# Patient Record
Sex: FEMALE | Race: WHITE | NOT HISPANIC OR LATINO | URBAN - METROPOLITAN AREA
[De-identification: names, ages, dates, MRNs, and addresses within clinical notes are randomized per-mention and may not be internally consistent; named-entity substitution may affect disease eponyms.]

---

## 2017-08-23 ENCOUNTER — TRANSFERRED RECORDS (OUTPATIENT)
Dept: HEALTH INFORMATION MANAGEMENT | Facility: CLINIC | Age: 32
End: 2017-08-23

## 2017-08-31 ENCOUNTER — OFFICE VISIT (OUTPATIENT)
Dept: OPHTHALMOLOGY | Facility: CLINIC | Age: 32
End: 2017-08-31
Attending: OPHTHALMOLOGY
Payer: COMMERCIAL

## 2017-08-31 ENCOUNTER — RESULTS ONLY (OUTPATIENT)
Dept: OTHER | Facility: CLINIC | Age: 32
End: 2017-08-31

## 2017-08-31 DIAGNOSIS — H35.352 CYSTOID MACULAR DEGENERATION OF RETINA, LEFT: ICD-10-CM

## 2017-08-31 DIAGNOSIS — H35.9 RETINAL LESION: ICD-10-CM

## 2017-08-31 DIAGNOSIS — H33.192 BULLOUS RETINOSCHISIS, LEFT: ICD-10-CM

## 2017-08-31 DIAGNOSIS — H30.23 INTERMEDIATE UVEITIS, BILATERAL: Primary | ICD-10-CM

## 2017-08-31 LAB
ALBUMIN SERPL-MCNC: 4 G/DL (ref 3.4–5)
ALP SERPL-CCNC: 108 U/L (ref 40–150)
ALT SERPL W P-5'-P-CCNC: 24 U/L (ref 0–50)
ANION GAP SERPL CALCULATED.3IONS-SCNC: 6 MMOL/L (ref 3–14)
AST SERPL W P-5'-P-CCNC: 17 U/L (ref 0–45)
BASOPHILS # BLD AUTO: 0 10E9/L (ref 0–0.2)
BASOPHILS NFR BLD AUTO: 0.4 %
BILIRUB DIRECT SERPL-MCNC: <0.1 MG/DL (ref 0–0.2)
BILIRUB SERPL-MCNC: 0.3 MG/DL (ref 0.2–1.3)
BUN SERPL-MCNC: 9 MG/DL (ref 7–30)
CALCIUM SERPL-MCNC: 8.6 MG/DL (ref 8.5–10.1)
CHLORIDE SERPL-SCNC: 109 MMOL/L (ref 94–109)
CO2 SERPL-SCNC: 24 MMOL/L (ref 20–32)
CREAT SERPL-MCNC: 0.63 MG/DL (ref 0.52–1.04)
CRP SERPL-MCNC: 4.2 MG/L (ref 0–8)
DIFFERENTIAL METHOD BLD: NORMAL
EOSINOPHIL # BLD AUTO: 0.1 10E9/L (ref 0–0.7)
EOSINOPHIL NFR BLD AUTO: 1.3 %
ERYTHROCYTE [DISTWIDTH] IN BLOOD BY AUTOMATED COUNT: 13.2 % (ref 10–15)
ERYTHROCYTE [SEDIMENTATION RATE] IN BLOOD BY WESTERGREN METHOD: 14 MM/H (ref 0–20)
GFR SERPL CREATININE-BSD FRML MDRD: >90 ML/MIN/1.7M2
GLUCOSE SERPL-MCNC: 116 MG/DL (ref 70–99)
HCT VFR BLD AUTO: 39.7 % (ref 35–47)
HGB BLD-MCNC: 13.4 G/DL (ref 11.7–15.7)
HIV 1+2 AB+HIV1 P24 AG SERPL QL IA: NONREACTIVE
IMM GRANULOCYTES # BLD: 0 10E9/L (ref 0–0.4)
IMM GRANULOCYTES NFR BLD: 0.1 %
LYMPHOCYTES # BLD AUTO: 1.6 10E9/L (ref 0.8–5.3)
LYMPHOCYTES NFR BLD AUTO: 24 %
MCH RBC QN AUTO: 29.4 PG (ref 26.5–33)
MCHC RBC AUTO-ENTMCNC: 33.8 G/DL (ref 31.5–36.5)
MCV RBC AUTO: 87 FL (ref 78–100)
MONOCYTES # BLD AUTO: 0.4 10E9/L (ref 0–1.3)
MONOCYTES NFR BLD AUTO: 5.3 %
NEUTROPHILS # BLD AUTO: 4.7 10E9/L (ref 1.6–8.3)
NEUTROPHILS NFR BLD AUTO: 68.9 %
NRBC # BLD AUTO: 0 10*3/UL
NRBC BLD AUTO-RTO: 0 /100
PLATELET # BLD AUTO: 223 10E9/L (ref 150–450)
POTASSIUM SERPL-SCNC: 3.7 MMOL/L (ref 3.4–5.3)
PROT SERPL-MCNC: 8.1 G/DL (ref 6.8–8.8)
RBC # BLD AUTO: 4.56 10E12/L (ref 3.8–5.2)
SODIUM SERPL-SCNC: 139 MMOL/L (ref 133–144)
WBC # BLD AUTO: 6.8 10E9/L (ref 4–11)

## 2017-08-31 PROCEDURE — 80048 BASIC METABOLIC PNL TOTAL CA: CPT | Performed by: OPHTHALMOLOGY

## 2017-08-31 PROCEDURE — 86140 C-REACTIVE PROTEIN: CPT | Performed by: OPHTHALMOLOGY

## 2017-08-31 PROCEDURE — 82232 ASSAY OF BETA-2 PROTEIN: CPT | Performed by: OPHTHALMOLOGY

## 2017-08-31 PROCEDURE — 80053 COMPREHEN METABOLIC PANEL: CPT | Performed by: OPHTHALMOLOGY

## 2017-08-31 PROCEDURE — 36415 COLL VENOUS BLD VENIPUNCTURE: CPT | Performed by: OPHTHALMOLOGY

## 2017-08-31 PROCEDURE — 83516 IMMUNOASSAY NONANTIBODY: CPT | Performed by: OPHTHALMOLOGY

## 2017-08-31 PROCEDURE — 92240 ICG ANGIOGRAPHY I&R UNI/BI: CPT | Mod: ZF | Performed by: OPHTHALMOLOGY

## 2017-08-31 PROCEDURE — 86480 TB TEST CELL IMMUN MEASURE: CPT | Performed by: OPHTHALMOLOGY

## 2017-08-31 PROCEDURE — 85025 COMPLETE CBC W/AUTO DIFF WBC: CPT | Performed by: OPHTHALMOLOGY

## 2017-08-31 PROCEDURE — 86695 HERPES SIMPLEX TYPE 1 TEST: CPT | Performed by: OPHTHALMOLOGY

## 2017-08-31 PROCEDURE — 86611 BARTONELLA ANTIBODY: CPT | Performed by: OPHTHALMOLOGY

## 2017-08-31 PROCEDURE — 82248 BILIRUBIN DIRECT: CPT | Performed by: OPHTHALMOLOGY

## 2017-08-31 PROCEDURE — 82164 ANGIOTENSIN I ENZYME TEST: CPT | Performed by: OPHTHALMOLOGY

## 2017-08-31 PROCEDURE — 92250 FUNDUS PHOTOGRAPHY W/I&R: CPT | Mod: ZF | Performed by: OPHTHALMOLOGY

## 2017-08-31 PROCEDURE — 81374 HLA I TYPING 1 ANTIGEN LR: CPT | Performed by: OPHTHALMOLOGY

## 2017-08-31 PROCEDURE — 86592 SYPHILIS TEST NON-TREP QUAL: CPT | Performed by: OPHTHALMOLOGY

## 2017-08-31 PROCEDURE — 83876 ASSAY MYELOPEROXIDASE: CPT | Performed by: OPHTHALMOLOGY

## 2017-08-31 PROCEDURE — 92235 FLUORESCEIN ANGRPH MLTIFRAME: CPT | Mod: ZF | Performed by: OPHTHALMOLOGY

## 2017-08-31 PROCEDURE — 86618 LYME DISEASE ANTIBODY: CPT | Performed by: OPHTHALMOLOGY

## 2017-08-31 PROCEDURE — 76510 OPH US DX B-SCAN&QUAN A-SCAN: CPT | Mod: ZF | Performed by: OPHTHALMOLOGY

## 2017-08-31 PROCEDURE — 87389 HIV-1 AG W/HIV-1&-2 AB AG IA: CPT | Performed by: OPHTHALMOLOGY

## 2017-08-31 PROCEDURE — 85652 RBC SED RATE AUTOMATED: CPT | Performed by: OPHTHALMOLOGY

## 2017-08-31 PROCEDURE — 86778 TOXOPLASMA ANTIBODY IGM: CPT | Performed by: OPHTHALMOLOGY

## 2017-08-31 PROCEDURE — 86777 TOXOPLASMA ANTIBODY: CPT | Performed by: OPHTHALMOLOGY

## 2017-08-31 PROCEDURE — 86780 TREPONEMA PALLIDUM: CPT | Performed by: OPHTHALMOLOGY

## 2017-08-31 PROCEDURE — 92134 CPTRZ OPH DX IMG PST SGM RTA: CPT | Mod: ZF | Performed by: OPHTHALMOLOGY

## 2017-08-31 PROCEDURE — 99213 OFFICE O/P EST LOW 20 MIN: CPT | Mod: ZF

## 2017-08-31 PROCEDURE — 86666 EHRLICHIA ANTIBODY: CPT | Performed by: OPHTHALMOLOGY

## 2017-08-31 PROCEDURE — 86696 HERPES SIMPLEX TYPE 2 TEST: CPT | Performed by: OPHTHALMOLOGY

## 2017-08-31 ASSESSMENT — VISUAL ACUITY
METHOD: SNELLEN - LINEAR
CORRECTION_TYPE: GLASSES
OD_CC: 20/20
OS_CC: 20/30

## 2017-08-31 ASSESSMENT — SLIT LAMP EXAM - LIDS
COMMENTS: NORMAL
COMMENTS: NORMAL

## 2017-08-31 ASSESSMENT — CONF VISUAL FIELD
METHOD: COUNTING FINGERS
OS_NORMAL: 1
OD_NORMAL: 1

## 2017-08-31 ASSESSMENT — TONOMETRY
OD_IOP_MMHG: 14
IOP_METHOD: TONOPEN
OS_IOP_MMHG: 14

## 2017-08-31 ASSESSMENT — EXTERNAL EXAM - LEFT EYE: OS_EXAM: NORMAL

## 2017-08-31 ASSESSMENT — EXTERNAL EXAM - RIGHT EYE: OD_EXAM: NORMAL

## 2017-08-31 ASSESSMENT — CUP TO DISC RATIO
OS_RATIO: 0.1
OD_RATIO: 0.1

## 2017-08-31 NOTE — LETTER
"8/31/2017       RE: Suma SIFUENTES  [address] [facility] [address]     Dear Chuy,    Thank you for referring your patient, Suma SIFUENTES, to the EYE CLINIC at [facility]. Please see a copy of my visit note below.    CC -   Consult regarding possible Retinoschisis vs Retinal detachment  left eye     INTERVAL HISTORY - Initial visit    HPI -  Suma SIFUENTES is a  32 year old year-old patient presenting for evaluation of retinoschisis and cystoid macular edema of her left eye. She has a distant history of uveitis, left eye ~2006, when she presented with pain left eye and intraocular inflammation. Per patient diagnosed with uveitis and treated with topical steroids, No records available. per patient extensive lab work up test was negative, including Lyme. She also has a history of IIH. She was initially seen by Dr. Roberts in 2012 and seen at [place] with headache, whooshing sound and blurry vision. MRI images at the time showed is no evidence of an abnormality in the orbits of either eye with slight possibility of a partially empty sella without flattening of the globes or enhancement of the optic nerves      At [place] was told that \"fluid was leaking around her optic nerve\". Did not receive diagnosis of IIH until 2015 in [place] when LP showed elevated ICP and \"some elevated markers for multiple sclerosis (per patient) but not brain lesions on MRI\". She was treated for approximately four months but intolerant to diamox, including joint pain and swelling. She then became pregnant and symptoms resolved after delivery.     Symptoms were quiescent until this July, she experienced eye ache and was seen by optometrist in [place]. Found to have schisis. Previous photos taken in 2016 show progression today (per patient). Seen by Dr. Aparicio 8/23/17 and sent to [facility] for further evaluation.    PAST OCULAR SURGERY  None    RETINAL IMAGING:  OCT 8/31/17  OD - nml  OS - cystoid macular edema, " epiretinal membrane,   schisis confirmed with Optical Coherence Tomography to the mid-inferior periphery without retinal detachment    optos photos 8/31/17  OS - schisis inferonasally and inferotemporally     FAF 8/31/17  OD - nml  OS - inferotemporal and inferonasal hypoautofluorescence     FA 8/31/17  OD 1+ perivenular leakage temporally  OS bullous schisis, tr late optic nerve head leakage , 2-3+ inferotemporal and nasal perivenular leakage    ICG 8/31/17  OD normal  OS normal    Ultrasound: schisis inferiorly with minimal retina  movement on dynamic ultrasound consistent with schisis    ASSESSMENT & PLAN  1) Intermediate uveitis  Right eye: Mild occult vasculitis detected on fluorescein angiography  left eye: retinal vasculitis, vitritis and cystoid macular edema. vitritis and cystoid macular edema likely chronic   Unclear etiology at this point. Long term history of uveitis left eye with prior lab work up negative per patient    Per pt had elevated MS markers on previous LP (will obtain records)   FA with perivascular leakage concerning for vasculitis OS>OD   Plan for complete uveitis panel and MRI w & w/o contrast   Start nevanac for cystoid macular edema left eye    Will start orally steroids once rule out infectious etiology   Will consider IMT for long term management based on results/etiology- will refer to uveitis    Plan for laser retinopexy to inferior periphery today    2) Bullous Retinoschisis, left eye   Concern for progression based on prior notes   Possible serous vs exudative component given history of uveitis and active vaculitis on fluorescein angiography    Will perform laser retinopexy and peripheral laser Today   If continues progression to Retinal detachment despite laser, will consider surgery with vitreous diagnostic biopsy    3) Epiretinal membrane, left eye   Likely related to #2   observe    4) refractive error   -mild astigmatism   Ok to fill previous Rx    return to clinic: 2 weeks  with retina with goldmann visual field (GVF), peripheral and macula Optical Coherence Tomography  Plan for uveitis consult next available      Again, thank you for allowing me to participate in the care of your patient.      Sincerely,    Marta Land MD     Retina Service   Department of Ophthalmology and Visual Neurosciences   [facility]  Phone:  [phone]   Fax:  [phone]

## 2017-08-31 NOTE — NURSING NOTE
Chief Complaints and History of Present Illnesses   Patient presents with     Consult For     Per pt. retinal cysts OS. Her for a second opinion.      HPI    Affected eye(s):  Both   Symptoms:     Blurred vision   No decreased vision   Floaters         Do you have eye pain now?:  No      Comments:  Hx of intercranial hypertension as well as floaters. Went in for exam and states they noted abnormalities that have changed in her left retina since her exam last year. Was sent to a retinal specialist in [place] and he sent her here for a second opinion.  Sophia Interiano COA 9:07 AM August 31, 2017

## 2017-08-31 NOTE — PROGRESS NOTES
"CC -   Consult regarding possible Retinoschisis vs Retinal detachment  left eye     INTERVAL HISTORY - Initial visit    HPI -  Suma SIFUENTES is a  32 year old year-old patient presenting for evaluation of retinoschisis and cystoid macular edema of her left eye. She has a distant history of uveitis, left eye ~2006, when she presented with pain left eye and intraocular inflammation. Per patient diagnosed with uveitis and treated with topical steroids, No records available. per patient extensive lab work up test was negative, including Lyme. She also has a history of IIH. She was initially seen by Dr. Roberts in 2012 and seen at [facility] with headache, whooshing sound and blurry vision. MRI images at the time showed is no evidence of an abnormality in the orbits of either eye with slight possibility of a partially empty sella without flattening of the globes or enhancement of the optic nerves      At [place] was told that \"fluid was leaking around her optic nerve\". Did not receive diagnosis of IIH until 2015 in [place] when LP showed elevated ICP and \"some elevated markers for multiple sclerosis (per patient) but not brain lesions on MRI\". She was treated for approximately four months but intolerant to diamox, including joint pain and swelling. She then became pregnant and symptoms resolved after delivery.     Symptoms were quiescent until this July, she experienced eye ache and was seen by optometrist in [place]. Found to have schisis. Previous photos taken in 2016 show progression today (per patient). Seen by Dr. Aparicio 8/23/17 and sent to [facility] for further evaluation.    PAST OCULAR SURGERY  None    RETINAL IMAGING:  OCT 8/31/17  OD - nml  OS - cystoid macular edema, epiretinal membrane,   schisis confirmed with Optical Coherence Tomography to the mid-inferior periphery without retinal detachment    optos photos 8/31/17  OS - schisis inferonasally and inferotemporally     FAF 8/31/17  OD - nml  OS - inferotemporal and " inferonasal hypoautofluorescence     FA 8/31/17  OD 1+ perivenular leakage temporally  OS bullous schisis, tr late optic nerve head leakage , 2-3+ inferotemporal and nasal perivenular leakage    ICG 8/31/17  OD normal  OS normal    Ultrasound: schisis inferiorly with minimal retina  movement on dynamic ultrasound consistent with schisis    ASSESSMENT & PLAN  1) Intermediate uveitis  Right eye: Mild occult vasculitis detected on fluorescein angiography  left eye: retinal vasculitis, vitritis and cystoid macular edema. vitritis and cystoid macular edema likely chronic   Unclear etiology at this point. Long term history of uveitis left eye with prior lab work up negative per patient    Per pt had elevated MS markers on previous LP (will obtain records)   FA with perivascular leakage concerning for vasculitis OS>OD   Plan for complete uveitis panel and MRI w & w/o contrast   Start nevanac for cystoid macular edema left eye    Will start orally steroids once rule out infectious etiology   Will consider IMT for long term management based on results/etiology- will refer to uveitis    Plan for laser retinopexy to inferior periphery today    2) Bullous Retinoschisis, left eye   Concern for progression based on prior notes   Possible serous vs exudative component given history of uveitis and active vaculitis on fluorescein angiography    Will perform laser retinopexy and peripheral laser Today   If continues progression to Retinal detachment despite laser, will consider surgery with vitreous diagnostic biopsy   Will follow up with fluorescein angiography and Optical Coherence Tomography q3 months approximately     3) Epiretinal membrane, left eye   Likely related to #2   observe    4) refractive error   -mild astigmatism   Ok to fill previous Rx    return to clinic: 2 weeks with retina with goldmann visual field (GVF), peripheral and macula Optical Coherence Tomography  Plan for uveitis consult -- to schedule [place]  appointment next available    Ortiz Das MD  PGY3, Dept of Ophthalmology  Pager [phone]    ~~~~~~~~~~~~~~~~~~~~~~~~~~~~~~~~~~   Complete documentation of historical and exam elements from today's encounter can be found in the full encounter summary report (not reduplicated in this progress note).  I personally obtained the chief complaint(s) and history of present illness.  I confirmed and edited as necessary the review of systems, past medical/surgical history, family history, social history, and examination findings as documented by others; and I examined the patient myself.  I personally reviewed the relevant tests, images, and reports as documented above.  I formulated and edited as necessary the assessment and plan and discussed the findings and management plan with the patient and family    Marta Land MD  .  Retina Service   Department of Ophthalmology and Visual Neurosciences   [facility]  Phone: [phone]   Fax: [phone]

## 2017-08-31 NOTE — MR AVS SNAPSHOT
After Visit Summary   8/31/2017    Suma SIFUENTES    MRN: [id number]           Patient Information     Date Of Birth          1985        Visit Information        Provider Department      8/31/2017 9:15 AM Marta Land MD Eye Clinic        Today's Diagnoses     Intermediate uveitis, bilateral    -  1    Retinal lesion        Bullous retinoschisis, left        Cystoid macular degeneration of retina, left           Follow-ups after your visit        Follow-up notes from your care team     Return in about 2 weeks (around 9/14/2017) for OCT.      Your next 10 appointments already scheduled     Sep 14, 2017  3:15 PM CDT   RETURN RETINA with Marta Land MD   Eye Clinic ([facility])    [address]  [address]  [address]  [facility] [address] [phone]              Future tests that were ordered for you today     Open Future Orders        Priority Expected Expires Ordered    MR Brain and Orbits Routine  8/31/2018 8/31/2017    XR Chest 2 Views Routine 8/31/2017 8/31/2018 8/31/2017            Who to contact     Please call your clinic at [phone] to:    Ask questions about your health    Make or cancel appointments    Discuss your medicines    Learn about your test results    Speak to your doctor   If you have compliments or concerns about an experience at your clinic, or if you wish to file a complaint, please contact [facility] Patient Relations at [phone] or email us at [email or web address]         Additional Information About Your Visit        MyChart Information     [facility] is an electronic gateway that provides easy, online access to your medical records. With [employer], you can request a clinic appointment, read your test results, renew a prescription or communicate with your care team.     To sign up for [facility] visit the website at [email or web address]   You will be asked to enter  the access code listed below, as well as some personal information. Please follow the directions to create your username and password.     Your access code is: [email or web address]  Expires: 2017  6:30 AM     Your access code will  in 90 days. If you need help or a new code, please contact your [facility] or call [phone] for assistance.        Care EveryWhere ID     This is your Care EveryWhere ID. This could be used by other organizations to access your [place] medical records  [id number]         Blood Pressure from Last 3 Encounters:   No data found for BP    Weight from Last 3 Encounters:   No data found for Wt              We Performed the Following     Anaplasma phagocytoph antibody IgG IgM     Angiotensin converting enzyme     Anti Treponema     B Henselae antibody IgG and IgM     Beta 2 microglobulin urine     Bilirubin direct     CBC with platelets differential     Comprehensive metabolic panel     CRP inflammation     Erythrocyte sedimentation rate auto     Fluorescein Angiography OS (left eye)     Fundus Photos OU (both eyes)     HCL FRANCISELLA TULAREMIA DARRION     Herpes Simplex Virus 1 and 2 IgG     HIV Antigen Antibody Combo     HLA-B27 Typing     ICG Angiography OU (both eyes)     Lyme Disease Darrion with reflex to WB Serum     M Tuberculosis by Quantiferon     OCT Retina Spectralis OU (both eyes)     RPR screen with reflex to confirm     Toxoplasma gondii abys IgG and IgM     US B-scan and A-scan OS (left eye)     Vasculitis panel          Today's Medication Changes          These changes are accurate as of: 17  4:47 PM.  If you have any questions, ask your nurse or doctor.               Start taking these medicines.        Dose/Directions    nepafenac 0.1 % ophthalmic susp   Commonly known as:  NEVANAC   Used for:  Cystoid macular degeneration of retina, left   Started by:  Marta Land MD        Dose:  1 drop   Place 1 drop Into the left eye 3  times daily   Quantity:  1 Bottle   Refills:  0            Where to get your medicines      Some of these will need a paper prescription and others can be bought over the counter.  Ask your nurse if you have questions.     Bring a paper prescription for each of these medications     nepafenac 0.1 % ophthalmic susp                Primary Care Provider Office Phone # Fax Jovan Rangel [phone]       [facility] [address]  [facility] [address]        Equal Access to Services     GAEL PAGE : Hadii aad ku hadasho Soomaali, waaxda luqadaha, qaybta kaalmada adeegyada, waxay idiin hayaan adeeg kharaever navarro. [facility] [phone].    ATENCIÓN: Si habla español, tiene a rothman disposición servicios gratuitos de asistencia lingüística. [facility] [phone].    We comply with applicable federal civil rights laws and [place] laws. We do not discriminate on the basis of race, color, national origin, age, disability sex, sexual orientation or gender identity.            Thank you!     Thank you for choosing EYE CLINIC  for your care. Our goal is always to provide you with excellent care. Hearing back from our patients is one way we can continue to improve our services. Please take a few minutes to complete the written survey that you may receive in the mail after your visit with us. Thank you!             Your Updated Medication List - Protect others around you: Learn how to safely use, store and throw away your medicines at www.disposemymeds.org.          This list is accurate as of: 8/31/17  4:47 PM.  Always use your most recent med list.                   Brand Name Dispense Instructions for use Diagnosis    nepafenac 0.1 % ophthalmic susp    NEVANAC    1 Bottle    Place 1 drop Into the left eye 3 times daily    Cystoid macular degeneration of retina, left

## 2017-09-01 LAB
ACE SERPL-CCNC: 24 U/L (ref 9–67)
B BURGDOR IGG+IGM SER QL: 0.07 (ref 0–0.89)
HLA-B27 QL NAA+PROBE: NORMAL
HSV1 IGG SERPL QL IA: >8 AI (ref 0–0.8)
HSV2 IGG SERPL QL IA: <0.2 AI (ref 0–0.8)
MYELOPEROXIDASE AB SER-ACNC: <0.2 AI (ref 0–0.9)
PROTEINASE3 IGG SER-ACNC: <0.2 AI (ref 0–0.9)
RPR SER QL: NEGATIVE
T GONDII IGG SER-ACNC: <3 IU/ML
T GONDII IGM SER-ACNC: 3.1 AU/ML
T PALLIDUM IGG+IGM SER QL: NEGATIVE

## 2017-09-02 LAB
B2 MICROGLOB UR-MCNC: 53 UG/L (ref 0–300)
B2 MICROGLOB/CREAT UR: 64 UG/G CRT (ref 0–300)
CREAT UR-MCNC: 83 MG/DL
PH UR: 6 [PH]

## 2017-09-03 LAB
A PHAGOCYTOPH IGG TITR SER IF: NORMAL {TITER}
A PHAGOCYTOPH IGM TITR SER IF: NORMAL {TITER}
B HENSELAE IGG TITR SER IF: NORMAL {TITER}
B HENSELAE IGM TITR SER IF: NORMAL {TITER}
M TB TUBERC IFN-G BLD QL: NEGATIVE
M TB TUBERC IFN-G/MITOGEN IGNF BLD: 0.02 IU/ML

## 2017-09-05 LAB
B LOCUS: NORMAL
B27TEST METHOD: NORMAL

## 2017-09-06 DIAGNOSIS — H52.13 MYOPIA OF BOTH EYES: Primary | ICD-10-CM

## 2017-09-06 RX ORDER — KETOROLAC TROMETHAMINE 4 MG/ML
1 SOLUTION/ DROPS OPHTHALMIC 3 TIMES DAILY
Qty: 1 BOTTLE | Refills: 11 | Status: SHIPPED | OUTPATIENT
Start: 2017-09-06 | End: 2017-11-17

## 2017-09-14 ENCOUNTER — HOSPITAL ENCOUNTER (OUTPATIENT)
Dept: MRI IMAGING | Facility: CLINIC | Age: 32
Discharge: HOME | End: 2017-09-14
Attending: OPHTHALMOLOGY | Admitting: OPHTHALMOLOGY
Payer: COMMERCIAL

## 2017-09-14 ENCOUNTER — OFFICE VISIT (OUTPATIENT)
Dept: OPHTHALMOLOGY | Facility: CLINIC | Age: 32
End: 2017-09-14
Attending: OPHTHALMOLOGY
Payer: COMMERCIAL

## 2017-09-14 DIAGNOSIS — H30.23 INTERMEDIATE UVEITIS, BILATERAL: ICD-10-CM

## 2017-09-14 DIAGNOSIS — H20.9 UVEITIS: Primary | ICD-10-CM

## 2017-09-14 PROCEDURE — 70553 MRI BRAIN STEM W/O & W/DYE: CPT

## 2017-09-14 PROCEDURE — A9585 GADOBUTROL INJECTION: HCPCS | Performed by: OPHTHALMOLOGY

## 2017-09-14 PROCEDURE — 25000128 H RX IP 250 OP 636: Performed by: OPHTHALMOLOGY

## 2017-09-14 PROCEDURE — 99212 OFFICE O/P EST SF 10 MIN: CPT | Mod: ZF

## 2017-09-14 RX ORDER — GADOBUTROL 604.72 MG/ML
10 INJECTION INTRAVENOUS ONCE
Status: COMPLETED | OUTPATIENT
Start: 2017-09-14 | End: 2017-09-14

## 2017-09-14 RX ADMIN — GADOBUTROL 7.5 ML: 604.72 INJECTION INTRAVENOUS at 14:45

## 2017-09-14 ASSESSMENT — EXTERNAL EXAM - LEFT EYE: OS_EXAM: NORMAL

## 2017-09-14 ASSESSMENT — VISUAL ACUITY
OS_CC: 20/50
CORRECTION_TYPE: GLASSES
OS_CC+: -2
OD_CC: 20/20
METHOD: SNELLEN - LINEAR

## 2017-09-14 ASSESSMENT — CUP TO DISC RATIO
OS_RATIO: 0.1
OD_RATIO: 0.1

## 2017-09-14 ASSESSMENT — SLIT LAMP EXAM - LIDS
COMMENTS: NORMAL
COMMENTS: NORMAL

## 2017-09-14 ASSESSMENT — CONF VISUAL FIELD
OD_NORMAL: 1
OS_NORMAL: 1

## 2017-09-14 ASSESSMENT — TONOMETRY
OD_IOP_MMHG: 13
OS_IOP_MMHG: 12
IOP_METHOD: ICARE

## 2017-09-14 ASSESSMENT — EXTERNAL EXAM - RIGHT EYE: OD_EXAM: NORMAL

## 2017-09-14 NOTE — NURSING NOTE
Chief Complaints and History of Present Illnesses   Patient presents with     Follow Up For     Intermediate uveitis     HPI    Affected eye(s):  Both   Symptoms:        Duration:  2 weeks   Frequency:  Constant       Do you have eye pain now?:  No      Comments:  Pt. States that she is seeing a new blurry spot LE in central vision.  Occasional pain LE.  No flashes or floaters BE.  Lisa Gwen COT 3:35 PM September 14, 2017

## 2017-09-14 NOTE — PROGRESS NOTES
"CC -   Consult regarding possible Retinoschisis vs Retinal detachment  left eye     HPI -  Suma SIFUENTES is a  32 year old year-old patient presenting for evaluation of retinoschisis and cystoid macular edema of her left eye. She has a distant history of uveitis, left eye ~2006, when she presented with pain left eye and intraocular inflammation. Per patient diagnosed with uveitis and treated with topical steroids, No records available. per patient extensive lab work up test was negative, including Lyme. She also has a history of IIH. She was initially seen by Dr. Roberts in 2012 and seen at [place] with headache, whooshing sound and blurry vision. MRI images at the time showed is no evidence of an abnormality in the orbits of either eye with slight possibility of a partially empty sella without flattening of the globes or enhancement of the optic nerves      At [place] was told that \"fluid was leaking around her optic nerve\". Did not receive diagnosis of IIH until 2015 in [place] when LP showed elevated ICP and \"some elevated markers for multiple sclerosis (per patient) but not brain lesions on MRI\". She was treated for approximately four months but intolerant to diamox, including joint pain and swelling. She then became pregnant and symptoms resolved after delivery.     Symptoms were quiescent until this July, she experienced eye ache and was seen by optometrist in [place]. Found to have schisis. Previous photos taken in 2016 show progression today (per patient). Seen by Dr. Aparicio 8/23/17 and sent to [facility] for further evaluation.    Interval history: slightly increased in floaters after the laser. Light sensitivity     PAST OCULAR SURGERY None    RETINAL IMAGING:  OCT 9.14.17  OD - nml  OS - cystoid macular edema, epiretinal membrane, slightly increased cystoid macular edema   schisis confirmed with Optical Coherence Tomography to the mid-inferior periphery without retinal detachment    optos photos 8/31/17  OS - schisis " inferonasally and inferotemporally     FAF 8/31/17  OD - nml  OS - inferotemporal and inferonasal hypoautofluorescence     FA 8/31/17  OD 1+ perivenular leakage temporally  OS bullous schisis, tr late optic nerve head leakage , 2-3+ inferotemporal and nasal perivenular leakage    ICG 8/31/17  OD normal  OS normal    Ultrasound: schisis inferiorly with minimal retina  movement on dynamic ultrasound consistent with schisis    Labs:  Treponema pallidum Antibody neg  Sed Rate 14  CRP Inflammation 4.2  Rapid Plasma Reagin neg  Angiotensin Converting Enzyme 24  M Tuberculosis Antigen Value neg  A Phagocytophil IgG IgM neg  HIV neg  Beta-2-Microglobulin Timed Urine 53 normal   complete blood count (CBC): normal   Comprehensive metabolic panel : normal   Vasculitis panel : normal   B Henselae DARRION IgG: neg  Toxoplasma Antibody IgGand IgM normal   Lyme: neg  Herpes simples virus 1 IgG >8.0 high  Herpes simples virus 2 IgG <0.2  HLAB27 neg    ASSESSMENT & PLAN  1) Intermediate uveitis  Right eye: Mild occult vasculitis detected on fluorescein angiography  left eye: retinal vasculitis, vitritis and cystoid macular edema. vitritis and cystoid macular edema likely chronic   Unclear etiology at this point. Long term history of uveitis left eye with prior lab work up negative per patient    Per pt had elevated MS markers on previous LP (will obtain records)   FA with perivascular leakage concerning for vasculitis OS>OD   Plan for complete uveitis panel and MRI w & w/o contrast  Today Optical Coherence Tomography with increased cystoid macular edema    Start ketorolac four times a day edema left eye    Consider orally steroids? periocular kenalog? Patient to see Dr. Dominguez tomorrow. consider IMT for long term management based on etiology- will refer to uveitis    MRI of the brain and orbits done today- pending results    2) Bullous Retinoschisis, left eye status post laser retinopexy left eye    Good laser demarcation and fluid  not extending posterior to the laser   Initial Concern for progression based on prior notes   Possible serous vs exudative component given history of uveitis and active vaculitis on fluorescein angiography    If continues progression to Retinal detachment despite laser, will consider surgery with vitreous biopsy   consider follow up with fluorescein angiography and Optical Coherence Tomography q3 months approximately or per uveitis recs     3) Epiretinal membrane, left eye   Likely related to #2   observe    4) refractive error   -mild astigmatism   Ok to fill previous Rx    return to clinic: Plan for uveitis consult --Dr. Dominguez  tomorrow    ~~~~~~~~~~~~~~~~~~~~~~~~~~~~~~~~~~   Complete documentation of historical and exam elements from today's encounter can be found in the full encounter summary report (not reduplicated in this progress note).  I personally obtained the chief complaint(s) and history of present illness.  I confirmed and edited as necessary the review of systems, past medical/surgical history, family history, social history, and examination findings as documented by others; and I examined the patient myself.  I personally reviewed the relevant tests, images, and reports as documented above.  I formulated and edited as necessary the assessment and plan and discussed the findings and management plan with the patient and family    Marta Land MD  .  Retina Service   Department of Ophthalmology and Visual Neurosciences   [facility]  Phone: [phone]   Fax: [phone]

## 2017-09-14 NOTE — MR AVS SNAPSHOT
After Visit Summary   2017    Suma SIFUENTES    MRN: [id number]           Patient Information     Date Of Birth          1985        Visit Information        Provider Department      2017 3:15 PM Marta Land MD Eye Clinic        Today's Diagnoses     Uveitis    -  1       Follow-ups after your visit        Follow-up notes from your care team     Return in about 1 day (around 9/15/2017).      Your next 10 appointments already scheduled     Sep 15, 2017  1:00 PM CDT   RETURN UVEITIS with Shannon Dominguez MD   Eye Clinic ([facility])    [address]  [address]  [address] Clin 9a  [facility] [address] [phone]              Who to contact     Please call your clinic at [phone] to:    Ask questions about your health    Make or cancel appointments    Discuss your medicines    Learn about your test results    Speak to your doctor   If you have compliments or concerns about an experience at your clinic, or if you wish to file a complaint, please contact [facility] Patient Relations at [phone] or email us at [email or web address]         Additional Information About Your Visit        MyChart Information     Think Through Learningt is an electronic gateway that provides easy, online access to your medical records. With [employer], you can request a clinic appointment, read your test results, renew a prescription or communicate with your care team.     To sign up for Think Through Learningt visit the website at [email or web address]   You will be asked to enter the access code listed below, as well as some personal information. Please follow the directions to create your username and password.     Your access code is: [email or web address]  Expires: 2017  6:30 AM     Your access code will  in 90 days. If you need help or a new code, please contact your [facility] or call [phone] for  assistance.        Care EveryWhere ID     This is your Care EveryWhere ID. This could be used by other organizations to access your [place] medical records  [id number]         Blood Pressure from Last 3 Encounters:   No data found for BP    Weight from Last 3 Encounters:   No data found for Wt              We Performed the Following     OCT Retina Spectralis OU (both eyes)        Primary Care Provider Office Phone # Fax #    Leilani Rangel [phone]       [facility] [address]  [facility] [address]        Equal Access to Services     [facility]byterian Intercommunity HospitalFERNANDA : Hadii aad ku hadasho Soomaali, waaxda luqadaha, qaybta kaalmada adeegyada, waxay idiin hayaan adeeg kharaever ambrose . So [facility] [phone].    ATENCIÓN: Si habla español, tiene a rothman disposición servicios gratuitos de asistencia lingüística. LlKettering Health Washington Township [phone].    We comply with applicable federal civil rights laws and [place] laws. We do not discriminate on the basis of race, color, national origin, age, disability sex, sexual orientation or gender identity.            Thank you!     Thank you for choosing EYE CLINIC  for your care. Our goal is always to provide you with excellent care. Hearing back from our patients is one way we can continue to improve our services. Please take a few minutes to complete the written survey that you may receive in the mail after your visit with us. Thank you!             Your Updated Medication List - Protect others around you: Learn how to safely use, store and throw away your medicines at www.disposemymeds.org.          This list is accurate as of: 9/14/17  5:04 PM.  Always use your most recent med list.                   Brand Name Dispense Instructions for use Diagnosis    ketorolac tromethamine 0.4 % Soln ophthalmic solution    ACULAR-LS    1 Bottle    Place 1 drop Into the left eye 3 times daily    Intermediate uveitis, bilateral

## 2017-09-15 ENCOUNTER — OFFICE VISIT (OUTPATIENT)
Dept: OPHTHALMOLOGY | Facility: CLINIC | Age: 32
End: 2017-09-15
Attending: OPHTHALMOLOGY
Payer: COMMERCIAL

## 2017-09-15 DIAGNOSIS — H30.23 INTERMEDIATE UVEITIS, BILATERAL: Primary | ICD-10-CM

## 2017-09-15 DIAGNOSIS — H35.352 CYSTOID MACULAR DEGENERATION OF RETINA, LEFT: ICD-10-CM

## 2017-09-15 PROCEDURE — 99213 OFFICE O/P EST LOW 20 MIN: CPT | Mod: ZF

## 2017-09-15 PROCEDURE — 92250 FUNDUS PHOTOGRAPHY W/I&R: CPT | Mod: ZF | Performed by: OPHTHALMOLOGY

## 2017-09-15 RX ORDER — PREDNISONE 20 MG/1
60 TABLET ORAL DAILY
Qty: 180 TABLET | Refills: 0 | Status: SHIPPED | OUTPATIENT
Start: 2017-09-15 | End: 2017-09-29

## 2017-09-15 ASSESSMENT — CUP TO DISC RATIO
OD_RATIO: 0.1
OS_RATIO: 0.1

## 2017-09-15 ASSESSMENT — CONF VISUAL FIELD
OS_NORMAL: 1
OD_NORMAL: 1

## 2017-09-15 ASSESSMENT — VISUAL ACUITY
METHOD: SNELLEN - LINEAR
OD_CC: 20/20
OS_CC: 20/50+2

## 2017-09-15 ASSESSMENT — EXTERNAL EXAM - LEFT EYE: OS_EXAM: NORMAL

## 2017-09-15 ASSESSMENT — TONOMETRY
OD_IOP_MMHG: 13
IOP_METHOD: ICARE
OS_IOP_MMHG: 12

## 2017-09-15 ASSESSMENT — EXTERNAL EXAM - RIGHT EYE: OD_EXAM: NORMAL

## 2017-09-15 ASSESSMENT — SLIT LAMP EXAM - LIDS
COMMENTS: NORMAL
COMMENTS: NORMAL

## 2017-09-15 NOTE — PROGRESS NOTES
CC: Uveitis evaluation     HPI: Suma SIFUENTES is a 32 year old female who presents with    Ocular history:   History of papilledema     Medical history:  Healthy    Review of systems: Intentional weight loss, otherwise negative.     Social history: Suma does not smoke, drinks occ and no IV drug use. She currently has a dog and has never had a cat. She has lived in the [place] her entire life    Laboratory/Imaging Results:    Ocular Imaging:    Impression/Plan:  1.

## 2017-09-15 NOTE — LETTER
September 15, 2017      Marta Land MD  [address] [facility] [facility] [address]  [place] [address]       RE: SHAMIKA SIFUENTES  [address] [place] [address]       Dear Marta,    Thank you for referring your patient, Shamika SIFUENTES, to the uveitis clinic at [facility]. 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 As an infectious etiology was not identified in the lab workup that you ordered, I started her on 60 mg/day prednisone and will see her back in 2 weeks. Please see a copy of my visit note below.    CC: Uveitis evaluation    HPI: Shamika SIFUENTES is a 32 year old  female referred by Dr. Land for uveitis evaluation. In July 2017 she developed left eye ache. Although this symptom was different than her symptoms with IIH, she was concerned that the IIH was returning. She was initially evaluated by optometry and then referred to Dr. Chris Aparicio, who referred her to [facility] for further evaluation of schisis. She saw Dr. Land about 2 weeks ago and was found to have retinal vasculitis.     Ocular history:   1. 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 Retinoschisis cavities left eye. S/p laser barricade left eye 8.31.17 (Shanda).  3. IIH diagnosed in 2015 but sympoms started in 2012, treated with 4 months diamox (had side effects: joint swelling and pain). Symptoms resolved near the end of pregnancy.  4. History of red left eye about 10 years ago, treated with a short course of topical corticosteroids, patient unsure of diagnosis.             Medical history:  1. 2 uncomplicated pregnancies (vaginal delivery), although blood pressure was slightly elevated at the end of more recent pregnancy.  2. Not currently pregnant or breastfeeding.  3. Mirena IUD - not planning pregnancy soon.  4. Negative for diabetes  mellitus.     Review of systems: Unremarkable. Specifically, negative for focal numbness, tingling, episodes of vision loss or diplopia. Negative for genital ulcers and pathergy reaction.    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     Laboratory/Imaging Results:  MRI brain/orbits 17: unremarkable.  Labs 17: Quantiferon, RPR, Treponema pallidum ab, Lyme, HIV, Toxoplasma IgM/IgG, Bartonella henselae IgM/IgG, anti-Anaplsma phagocytophilum (HGA) IgM/IgG, urinary beta-2 microglobulin, anti-MPO, anti-pro3, ACE, ESR, CRP, and HLA-B27 all normal/negative. HSV1 IgG positive, HSV2 IgG negative. Complete blood count (CBC) with diff normal. Complete metabolic panel (CMP) unremarkable except for slightly elevated glucose (116 but non-fasting).    Ocular Imagin.15.17 Optos fundus photos document clinical exam.  17 mac OCT shows intraretinal and subretinal fluid left eye, likely not related to epiretinal membrane.  17 Optos fluorescein angiography shows telangiectatic, leaky vessels in areas of schisis cysts left eye. There is focal venuolar leakage and mild optic nerve head leakage left eye > right eye. Also posterior pole venuolar leakage left eye.    Impression/Plan:  1. 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 Extensive workup was unrevealing.   - Start prednisone 60 mg/day   - Supplement with calcium/vitamin D   - In near future may consider periocular Kenalog as well, if needed   - May require steroid-sparing immunosuppression. Patient wants to receive care closer to home                 ([place]). Recommend that she follow up with Dr. Tapia (rheumatologist, previously saw in                 [phone]) to re-establish care.  2. 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   - Patient to continue to follow with Dr. Land  3. Cystoid macular edema left eye, likely secondary to #1.   - Oral  prednisone course started, as described above    Return to uveitis clinic in 2 weeks V/T/D/mac OCT      Again, thank you for allowing me to participate in the care of your patient.      Sincerely,    Shannon Dominguez MD    Cc: Leilani Rangel MD; Eldon Tapia MD; Chris Aparicio MD

## 2017-09-15 NOTE — MR AVS SNAPSHOT
After Visit Summary   9/15/2017    Suma SIFUENTES    MRN: [id number]           Patient Information     Date Of Birth          1985        Visit Information        Provider Department      9/15/2017 1:00 PM Shannon Dominguez MD Eye Clinic        Today's Diagnoses     Intermediate uveitis, bilateral    -  1      Care Instructions    Prednisone 60 mg/day in the morning  Calcium (1200 mg/day) + vitamin D (800 IU/day)          Follow-ups after your visit        Follow-up notes from your care team     Return in about 2 weeks (around 9/29/2017) for Follow Up.      Your next 10 appointments already scheduled     Sep 29, 2017  3:00 PM CDT   RETURN UVEITIS with Shannon Dominguez MD   Eye Clinic ([facility])    [address] [facility]  [address] [facility]  [address] [id number] [phone]              Who to contact     Please call your clinic at [phone] to:    Ask questions about your health    Make or cancel appointments    Discuss your medicines    Learn about your test results    Speak to your doctor   If you have compliments or concerns about an experience at your clinic, or if you wish to file a complaint, please contact [facility] Patient Relations at [phone] or email us at [email or web address]         Additional Information About Your Visit        MyChart Information     Spartz is an electronic gateway that provides easy, online access to your medical records. With Spartz, you can request a clinic appointment, read your test results, renew a prescription or communicate with your care team.     To sign up for BevSpott visit the website at [email or web address]   You will be asked to enter the access code listed below, as well as some personal information. Please follow the directions to create your username and password.     Your access code is: [email or web address]  Expires: 11/22/2017  6:30 AM     Your access code  will  in 90 days. If you need help or a new code, please contact your [facility] or call [phone] for assistance.        Care EveryWhere ID     This is your Care EveryWhere ID. This could be used by other organizations to access your [place] medical records  [id number]         Blood Pressure from Last 3 Encounters:   No data found for BP    Weight from Last 3 Encounters:   No data found for Wt              We Performed the Following     Fundus Photos OU (both eyes)          Today's Medication Changes          These changes are accurate as of: 9/15/17  2:24 PM.  If you have any questions, ask your nurse or doctor.               Start taking these medicines.        Dose/Directions    predniSONE 20 MG tablet   Commonly known as:  DELTASONE   Used for:  Intermediate uveitis, bilateral   Started by:  Shannon Dominguez MD        Dose:  60 mg   Take 3 tablets (60 mg) by mouth daily   Quantity:  180 tablet   Refills:  0            Where to get your medicines      These medications were sent to [facility]/pharmacy [place][id number] [place] - [address]  [address] [facility] [address] [place] [address]     Phone:  [phone]     predniSONE 20 MG tablet                Primary Care Provider Office Phone # Fax #    Leilani COATS [facility] [phone]       [facility] [address]  [facility] [address]        Equal Access to Services     GAEL PAGE AH: Hadii harley ku hadasho Soomaali, waaxda luqadaha, qaybta kaalmada adeegyada, efra belloin hayaravindn ginny navarro. So [facility] [phone].    ATENCIÓN: Si habla español, tiene a rothman disposición servicios gratuitos de asistencia lingüística. Llame al [phone].    We comply with applicable federal civil rights laws and [place] laws. We do not discriminate on the basis of race, color, national origin, age, disability sex, sexual orientation or gender identity.            Thank you!     Thank you for choosing EYE CLINIC  for  your care. Our goal is always to provide you with excellent care. Hearing back from our patients is one way we can continue to improve our services. Please take a few minutes to complete the written survey that you may receive in the mail after your visit with us. Thank you!             Your Updated Medication List - Protect others around you: Learn how to safely use, store and throw away your medicines at www.disposemymeds.org.          This list is accurate as of: 9/15/17  2:24 PM.  Always use your most recent med list.                   Brand Name Dispense Instructions for use Diagnosis    ketorolac tromethamine 0.4 % Soln ophthalmic solution    ACULAR-LS    1 Bottle    Place 1 drop Into the left eye 3 times daily    Intermediate uveitis, bilateral       predniSONE 20 MG tablet    DELTASONE    180 tablet    Take 3 tablets (60 mg) by mouth daily    Intermediate uveitis, bilateral

## 2017-09-15 NOTE — PROGRESS NOTES
CC: uveitis evaluation    HPI: Suma SIFUENTES is a 32 year old  female referred by Dr. Land for uveitis evaluation. In July 2017 she developed left eye ache. Although this symptom was different than her symptoms with IIH, she was concerned that the IIH was returning. She was initially evaluated by optometry and then referred to Dr. Chris Aparicio, who referred her to [facility] for further evaluation of schisis. She saw Dr. Land about 2 weeks ago and was found to have retinal vasculitis.     Ocular history:   1. 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 Retinoschisis cavities left eye. S/p laser barricade left eye 8.31.17 (Shanda).  3. IIH diagnosed in 2015 but sympoms started in 2012, treated with 4 months diamox (had side effects: joint swelling and pain). Symptoms resolved near the end of pregnancy.  4. History of red left eye about 10 years ago, treated with a short course of topical corticosteroids, patient unsure of diagnosis.     Medical history:  1. 2 uncomplicated pregnancies (vaginal delivery), although blood pressure was slightly elevated at the end of more recent pregnancy.  2. Not currently pregnant or breastfeeding.  3. Mirena IUD - not planning pregnancy soon.  4. Negative for diabetes mellitus.     Review of systems: Unremarkable. Specifically, negative for focal numbness, tingling, episodes of vision loss or diplopia. Negative for genital ulcers and pathergy reaction.    Social history: [employer] . Never smoked. Alcohol use: ~one beverage monthly.     Laboratory/Imaging Results:  MRI brain/orbits 9.14.17: unremarkable.  Labs 8.31.17: Quantiferon, RPR, Treponema pallidum ab, Lyme, HIV, Toxoplasma IgM/IgG, Bartonella henselae IgM/IgG, anti-Anaplsma phagocytophilum (HGA) IgM/IgG, urinary beta-2 microglobulin, anti-MPO, anti-pro3, ACE, ESR, CRP, and HLA-B27 all normal/negative. HSV1 IgG positive, HSV2 IgG negative. Complete blood count (CBC)  with diff normal. Complete metabolic panel (CMP) unremarkable except for slightly elevated glucose (116 but non-fasting).    Ocular Imagin.15.17 Optos fundus photos document clinical exam.  17 mac OCT shows intraretinal and subretinal fluid left eye, likely not related to epiretinal membrane.  17 Optos fluorescein angiography shows telangiectatic, leaky vessels in areas of schisis cysts left eye. There is focal venuolar leakage and mild optic nerve head leakage left eye > right eye. Also posterior pole venuolar leakage left eye.    Impression/Plan:  1. 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 Extensive workup was unrevealing.   - Start prednisone 60 mg/day   - Supplement with calcium/vitamin D   - In near future may consider periocular Kenalog as well, if needed   - May require steroid-sparing immunosuppression. Patient wants to receive care closer to home ([place]). Recommend that she follow up with Dr. Tapia (rheumatologist, previously saw in [phone]) to re-establish care.  2. 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   - Patient to continue to follow with Dr. Land  3. Cystoid macular edema left eye, likely secondary to #1.   - Oral prednisone course started, as described above      Return to uveitis clinic in 2 weeks V/T/D/mac OCT    Attending Physician Attestation: Complete documentation of historical and exam elements from today's encounter can be found in the full encounter summary report (not reduplicated in this progress note). I personally obtained the chief complaint(s) and history of present illness. I confirmed and edited as necessary the review of systems, past medical/surgical history, family history, social history, and examination findings as documented by others. I examined the patient myself. I personally reviewed the relevant tests, images, and reports as documented above, and I agree with the interpretation as documented by the  resident/fellow and edited by me. I formulated and edited as necessary the assessment and plan and discussed the findings and management plan with the patient and family. When a procedure was performed, I either performed the procedure myself or was present for critical portions of the procedure and was immediately available for the entire procedure.   - Shannon Dominguez M.D.

## 2017-09-15 NOTE — NURSING NOTE
Chief Complaints and History of Present Illnesses   Patient presents with     Uveitis Evaluation     HPI    Affected eye(s):  Left   Symptoms:     Blurred vision   Decreased vision   Redness         Do you have eye pain now?:  No      Comments:  Started ketorolac yesterday. No changes since yesterday.     Niki CHIRINOS September 15, 2017 12:16 PM

## 2017-09-29 ENCOUNTER — OFFICE VISIT (OUTPATIENT)
Dept: OPHTHALMOLOGY | Facility: CLINIC | Age: 32
End: 2017-09-29
Attending: OPHTHALMOLOGY
Payer: COMMERCIAL

## 2017-09-29 DIAGNOSIS — H30.23 INTERMEDIATE UVEITIS, BILATERAL: Primary | ICD-10-CM

## 2017-09-29 DIAGNOSIS — H35.352 CYSTOID MACULAR DEGENERATION OF RETINA, LEFT: ICD-10-CM

## 2017-09-29 PROCEDURE — 99212 OFFICE O/P EST SF 10 MIN: CPT

## 2017-09-29 PROCEDURE — 92134 CPTRZ OPH DX IMG PST SGM RTA: CPT | Mod: ZF | Performed by: OPHTHALMOLOGY

## 2017-09-29 RX ORDER — PREDNISONE 20 MG/1
60 TABLET ORAL DAILY
Qty: 180 TABLET | Refills: 0 | Status: SHIPPED | OUTPATIENT
Start: 2017-09-29 | End: 2018-02-23

## 2017-09-29 ASSESSMENT — TONOMETRY
OS_IOP_MMHG: 8
IOP_METHOD: TONOPEN
OD_IOP_MMHG: 12

## 2017-09-29 ASSESSMENT — EXTERNAL EXAM - RIGHT EYE: OD_EXAM: NORMAL

## 2017-09-29 ASSESSMENT — VISUAL ACUITY
CORRECTION_TYPE: GLASSES
OD_CC: 20/20
OS_CC: 20/25
METHOD: SNELLEN - LINEAR
OS_CC+: -2

## 2017-09-29 ASSESSMENT — CONF VISUAL FIELD
OS_NORMAL: 1
OD_NORMAL: 1

## 2017-09-29 ASSESSMENT — CUP TO DISC RATIO
OD_RATIO: 0.1
OS_RATIO: 0.1

## 2017-09-29 ASSESSMENT — SLIT LAMP EXAM - LIDS
COMMENTS: NORMAL
COMMENTS: NORMAL

## 2017-09-29 ASSESSMENT — EXTERNAL EXAM - LEFT EYE: OS_EXAM: NORMAL

## 2017-09-29 NOTE — MR AVS SNAPSHOT
After Visit Summary   9/29/2017    Suma SIFUENTES    MRN: [id number]           Patient Information     Date Of Birth          1985        Visit Information        Provider Department      9/29/2017 3:00 PM Shannon Dominguez MD Eye Clinic        Today's Diagnoses     Intermediate uveitis, bilateral    -  1    Cystoid macular degeneration of retina, left          Care Instructions    Decrease prednisone to 40 mg/day for 2 weeks, then 20 mg/day.          Follow-ups after your visit        Follow-up notes from your care team     Return in about 3 weeks (around 10/20/2017).      Your next 10 appointments already scheduled     Oct 16, 2017 12:15 PM CDT   RETURN RETINA with Marta Land MD   Eye Clinic ([facility])    [address]  [address] [facility]  [address] [address]  [facility] [address] [phone]            Nov 17, 2017 12:30 PM CST   RETURN UVEITIS with Shannon Dominguez MD   Eye Clinic ([facility])    [address]  [address] [address]  [address] [address]  [facility] [address] [phone]              Who to contact     Please call your clinic at [phone] to:    Ask questions about your health    Make or cancel appointments    Discuss your medicines    Learn about your test results    Speak to your doctor   If you have compliments or concerns about an experience at your clinic, or if you wish to file a complaint, please contact [facility] Patient Relations at [phone] or email us at [email or web address]         Additional Information About Your Visit        MyChart Information     [employer] is an electronic gateway that provides easy, online access to your medical records. With [employer], you can request a clinic appointment, read your test results, renew a prescription or communicate with your care team.     To sign up for Aperto Networkst visit the website at [email or web address]   You will be  asked to enter the access code listed below, as well as some personal information. Please follow the directions to create your username and password.     Your access code is: [email or web address]  Expires: 2017  6:30 AM     Your access code will  in 90 days. If you need help or a new code, please contact your [facility] or call [phone] for assistance.        Care EveryWhere ID     This is your Care EveryWhere ID. This could be used by other organizations to access your [place] medical records  [id number]         Blood Pressure from Last 3 Encounters:   No data found for BP    Weight from Last 3 Encounters:   No data found for Wt              We Performed the Following     OCT Retina Spectralis OU (both eyes)          Where to get your medicines      These medications were sent to [facility]/pharmacy #[id number] - [place] - [address]  [address] [facility] [place] [address]     Phone:  [phone]     predniSONE 20 MG tablet          Primary Care Provider Office Phone # Fax #    [facility] [phone]       [facility] [address]  [facility] [address]        Equal Access to Services     GAEL PAGE : Hadii aad ku hadasho Soomaali, waaxda luqadaha, qaybta kaalmada adeegyada, waxay idiin hayaravindn ginny navarro. So [facility] [phone].    ATENCIÓN: Si habla español, tiene a rothman disposición servicios gratuitos de asistencia lingüística. Llame al [phone].    We comply with applicable federal civil rights laws and [place] laws. We do not discriminate on the basis of race, color, national origin, age, disability, sex, sexual orientation, or gender identity.            Thank you!     Thank you for choosing EYE CLINIC  for your care. Our goal is always to provide you with excellent care. Hearing back from our patients is one way we can continue to improve our services. Please take a few minutes to complete the written survey that you may  receive in the mail after your visit with us. Thank you!             Your Updated Medication List - Protect others around you: Learn how to safely use, store and throw away your medicines at www.disposemymeds.org.          This list is accurate as of: 9/29/17  4:09 PM.  Always use your most recent med list.                   Brand Name Dispense Instructions for use Diagnosis    ketorolac tromethamine 0.4 % Soln ophthalmic solution    ACULAR-LS    1 Bottle    Place 1 drop Into the left eye 3 times daily    Intermediate uveitis, bilateral       predniSONE 20 MG tablet    DELTASONE    180 tablet    Take 3 tablets (60 mg) by mouth daily    Intermediate uveitis, bilateral, Cystoid macular degeneration of retina, left

## 2017-09-29 NOTE — LETTER
September 29, 2017    Eldon Tapia MD  [facility]  [address] [place] [address]  Fax: [phone]       RE: SHAMIKA SIFUENTES  [address] [place] [address]       Dear Dr. Tapia,    I had the pleasure of seeing our mutual patient, Shamika SIFUENTES, in the EYE CLINIC at [facility]. I recommend steroid-sparing immunosuppression to treat her ocular inflammatory disease, starting with either methotrexate (goal dose 20-25 mg/wk) or CellCept (goal dose 5455-7159 mg twice a day). She would prefer that you manage her immunosuppressive therapy, so she has scheduled a visit with you. If it would be possible for her to start one of these agents earlier than her currently scheduled appointment in mid-November, that may facilitate a safe but more rapid prednisone taper. Please see a copy of my visit note below.    CC: Uveitis follow up    HPI: Shamika SIFUENTES is a 32 year old  female here for uveitis follow up. Now on oral prednisone 60 mg/day x 13 days, she reports mild improvement in vision left eye. Right eye vision remains at baseline.    Ocular history:   1. 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 In July 2017 she developed left eye ache. Although this symptom was different than her symptoms with IIH, she was concerned that the IIH was returning. She was initially evaluated by optometry and then referred to Dr. Chris Aparicio, who referred her to [facility] for further evaluation of schisis. She saw Dr. Land 8.31.17 and was found to have retinal vasculitis.   2. Retinoschisis cavities left eye. S/p laser barricade left eye 8.31.17 (Shanda).  3. IIH diagnosed in 2015 but symptoms started in 2012, treated with 4 months diamox (had side effects: joint swelling and pain). Symptoms resolved near the end of pregnancy.  4. History of red left eye about 10 years ago, treated with a short course of topical corticosteroids,  patient unsure of diagnosis.     Medical history:  1. 2 uncomplicated pregnancies (vaginal delivery), although blood pressure was slightly elevated at the end of more recent pregnancy.  2. Not currently pregnant or breastfeeding.  3. Mirena IUD - not planning pregnancy soon.  4. Negative for diabetes mellitus.  5. Scalp cyst.     Review of systems: Unremarkable. Specifically, negative for focal numbness, tingling, episodes of vision loss or diplopia. Negative for genital ulcers and pathergy reaction.    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 Alcohol use: ~one beverage monthly.     Laboratory/Imaging Results:  MRI brain/orbits 17: unremarkable.  Labs 17: Quantiferon, RPR, Treponema pallidum ab, Lyme, HIV, Toxoplasma IgM/IgG, Bartonella henselae IgM/IgG, anti-Anaplsma phagocytophilum (HGA) IgM/IgG, urinary beta-2 microglobulin, anti-MPO, anti-pro3, ACE, ESR, CRP, and HLA-B27 all normal/negative. HSV1 IgG positive, HSV2 IgG negative. Complete blood count (CBC) with diff normal. Complete metabolic panel (CMP) unremarkable except for slightly elevated glucose (116 but non-fasting).    Ocular Imagin17 mac OCT:  Right eye: within normal limits, stable vs 17 (i.e. no change with prednisone)  Left eye: epiretinal membrane, small subfoveal hyperreflective focus, intraretinal fluid essentially resolved. CSF decreased from 432 on 17 -> 359 um.    17 Optos fluorescein angiography shows telangiectatic, leaky vessels in areas of schisis cysts left eye. There is focal venuolar leakage and mild optic nerve head leakage left eye > right eye. Also posterior pole venuolar leakage left eye.    Impression/Plan:  1. 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 Macular intraretinal fluid resolved with 60 mg/day prednisone   - Decrease prednisone to 40 mg/day x 2 weeks, then 20 mg/day. May continue tapering steroid if                 mac OCT remains dry  and no evidence of new disease activity (recommend 20 mg/day x 2                 weeks, then 15 mg/day x 2 weeks, then 10 mg/day - repeat fluorescein angiography at 10 mg/day                 or sooner if indicated)   - Supplement with calcium/vitamin D   - May consider periocular Kenalog as well, if needed for inflammatory control, mitchell if patient is                 intolerant of oral prednisone (though she is tolerating it well at present)   - Recommend steroid-sparing immunosuppression. First-line would recommend either                 methotrexate (goal dose 20-25 mg/wk) or CellCept (goal dose 1000 - 1500 mg twice a day). If her                 inflammation does not respond to the first agent, then would recommend Humira. Patient wants to               receive care closer to home ([place]). Recommend that she follow up with Dr. Tapia                 (rheumatologist, previously saw in [id number]) to re-establish care for IMT management. She is                 scheduled to see him in mid-November, but ideally she would start the steroid-sparing agent                 sooner.      2. 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 Does not extend posterior to laser.   - Patient to continue to follow with Dr. Land, plan for follow up in about 3 weeks    3. Cystoid macular edema left eye, likely secondary to #1.   - Improvement with oral prednisone, as described above    Return to clinic:  1. 3 weeks with Dr. Land, if no evidence of disease worsening, may continue oral prednisone taper  2. 6 weeks uveitis clinic: V/T/D/mac OCT/Optos fluorescein angiography transit left eye      Again, thank you for allowing me to participate in the care of your patient.        Sincerely,      Shannon Dominguez MD      Cc:  MD Chris Varner MD

## 2017-09-29 NOTE — Clinical Note
Alexsander Lozano, Her left eye looks better on oral prednisone. Right now she is waiting for her appointment with rheum to start IMT. I sent her rheum a letter asking him if he would start the IMT sooner than her currently scheduled appointment in mid-November. She will be seeing you in follow up in 3 weeks, then me in 6 weeks. Please let me know if you have any questions. Thanks, Shannon

## 2017-09-29 NOTE — NURSING NOTE
Chief Complaints and History of Present Illnesses   Patient presents with     Follow Up For     2 week follow up uveitis     HPI    Affected eye(s):  Both   Symptoms:     Floaters (Comment: LE)   No flashes   No glare   No halos   No photophobia      Frequency:  Constant       Do you have eye pain now?:  No      Comments:  Uveitis BE  No vision change since last visit  Viridiana CHIRINOS 2:34 PM September 29, 2017

## 2017-09-29 NOTE — PROGRESS NOTES
CC: uveitis follow up    HPI: Suma SIFUENTES is a 32 year old  female here for uveitis follow up. Now on oral prednisone 60 mg/day x 13 days, she reports mild improvement in vision left eye. Right eye vision remains at baseline.    Ocular history:   1. 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 In July 2017 she developed left eye ache. Although this symptom was different than her symptoms with IIH, she was concerned that the IIH was returning. She was initially evaluated by optometry and then referred to Dr. Chris Aparicio, who referred her to [facility] for further evaluation of schisis. She saw Dr. Land 8.31.17 and was found to have retinal vasculitis.   2. Retinoschisis cavities left eye. S/p laser barricade left eye 8.31.17 (Shanda).  3. IIH diagnosed in 2015 but sympoms started in 2012, treated with 4 months diamox (had side effects: joint swelling and pain). Symptoms resolved near the end of pregnancy.  4. History of red left eye about 10 years ago, treated with a short course of topical corticosteroids, patient unsure of diagnosis.     Medical history:  1. 2 uncomplicated pregnancies (vaginal delivery), although blood pressure was slightly elevated at the end of more recent pregnancy.  2. Not currently pregnant or breastfeeding.  3. Mirena IUD - not planning pregnancy soon.  4. Negative for diabetes mellitus.  5. Scalp cyst.     Review of systems: Unremarkable. Specifically, negative for focal numbness, tingling, episodes of vision loss or diplopia. Negative for genital ulcers and pathergy reaction.    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     Laboratory/Imaging Results:  MRI brain/orbits 9.14.17: unremarkable.  Labs 8.31.17: Quantiferon, RPR, Treponema pallidum ab, Lyme, HIV, Toxoplasma IgM/IgG, Bartonella henselae IgM/IgG, anti-Anaplsma phagocytophilum (HGA) IgM/IgG, urinary beta-2 microglobulin, anti-MPO, anti-pro3,  ACE, ESR, CRP, and HLA-B27 all normal/negative. HSV1 IgG positive, HSV2 IgG negative. Complete blood count (CBC) with diff normal. Complete metabolic panel (CMP) unremarkable except for slightly elevated glucose (116 but non-fasting).    Ocular Imagin17 mac OCT:  Right eye: within normal limits, stable vs 17 (i.e. no change with prednisone)  Left eye: epiretinal membrane, small subfoveal hyperreflective focus, intraretinal fluid essentially resolved. CSF decreased from 432 on 17 -> 359 um.    17 Optos fluorescein angiography shows telangiectatic, leaky vessels in areas of schisis cysts left eye. There is focal venuolar leakage and mild optic nerve head leakage left eye > right eye. Also posterior pole venuolar leakage left eye.    Impression/Plan:  1. 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 Macular intraretinal fluid resolved with 60 mg/day prednisone   - Decrease prednisone to 40 mg/day x 2 weeks, then 20 mg/day. May continue tapering steroid if mac OCT remains dry and no evidence of new disease activity (recommend 20 mg/day x 2 weeks, then 15 mg/day x 2 weeks, then 10 mg/day - repeat fluorescein angiography at 10 mg/day or sooner if indicated)   - Supplement with calcium/vitamin D   - May consider periocular Kenalog as well, if needed for inflammatory control, mitchell if patient is intolerant of oral prednisone (though she is tolerating it well at present)   - Recommend steroid-sparing immunosuppression. First-line would recommend either methotrexate (goal dose 20-25 mg/wk) or CellCept (goal dose 1000 - 1500 mg twice a day). If her inflammation does not respond to the first agent, then would recommend Humira. Patient wants to receive care closer to home ([place]). Recommend that she follow up with Dr. Tapia (rheumatologist, previously saw in [phone]) to re-establish care for IMT managment. She is scheduled to see him in mid-November, but ideally she  would start the steroid-sparing agent sooner.    2. 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 Does not extend posterior to laser.   - Patient to continue to follow with Dr. Land, plan for follow up in about 3 weeks    3. Cystoid macular edema left eye, likely secondary to #1.   - Improvement with oral prednisone, as described above    Return to clinic:  1. 3 weeks with Dr. Land, if no evidence of disease worsening, may continue oral prednisone taper  2. 6 weeks uveitis clinic: V/T/D/mac OCT/Optos fluorescein angiography transit left eye    Attending Physician Attestation: Complete documentation of historical and exam elements from today's encounter can be found in the full encounter summary report (not reduplicated in this progress note). I personally obtained the chief complaint(s) and history of present illness. I confirmed and edited as necessary the review of systems, past medical/surgical history, family history, social history, and examination findings as documented by others. I examined the patient myself. I personally reviewed the relevant tests, images, and reports as documented above, and I agree with the interpretation as documented by the resident/fellow and edited by me. I formulated and edited as necessary the assessment and plan and discussed the findings and management plan with the patient and family. When a procedure was performed, I either performed the procedure myself or was present for critical portions of the procedure and was immediately available for the entire procedure.   - Shannon Dominguez M.D.

## 2017-10-16 ENCOUNTER — OFFICE VISIT (OUTPATIENT)
Dept: OPHTHALMOLOGY | Facility: CLINIC | Age: 32
End: 2017-10-16
Attending: OPHTHALMOLOGY
Payer: COMMERCIAL

## 2017-10-16 DIAGNOSIS — H20.9 UVEITIS: Primary | ICD-10-CM

## 2017-10-16 PROCEDURE — 99213 OFFICE O/P EST LOW 20 MIN: CPT | Mod: ZF

## 2017-10-16 PROCEDURE — 92134 CPTRZ OPH DX IMG PST SGM RTA: CPT | Mod: ZF | Performed by: OPHTHALMOLOGY

## 2017-10-16 RX ORDER — FOLIC ACID 1 MG/1
1 TABLET ORAL
COMMUNITY
Start: 2017-10-12 | End: 2021-01-22

## 2017-10-16 ASSESSMENT — SLIT LAMP EXAM - LIDS
COMMENTS: NORMAL
COMMENTS: NORMAL

## 2017-10-16 ASSESSMENT — VISUAL ACUITY
OS_CC: 20/30
OD_CC: 20/20
OD_CC: J1+
OS_CC+: +1
CORRECTION_TYPE: GLASSES
METHOD: SNELLEN - LINEAR
OS_CC: J1 SLOW

## 2017-10-16 ASSESSMENT — REFRACTION_WEARINGRX
OS_CYLINDER: +1.50
OS_AXIS: 095
OS_SPHERE: -1.50
OD_CYLINDER: +0.50
SPECS_TYPE: SVL
OD_SPHERE: -0.50
OD_AXIS: 087

## 2017-10-16 ASSESSMENT — CUP TO DISC RATIO
OD_RATIO: 0.1
OS_RATIO: 0.1

## 2017-10-16 ASSESSMENT — EXTERNAL EXAM - LEFT EYE: OS_EXAM: NORMAL

## 2017-10-16 ASSESSMENT — CONF VISUAL FIELD
OS_NORMAL: 1
OD_NORMAL: 1

## 2017-10-16 ASSESSMENT — TONOMETRY
OD_IOP_MMHG: 16
OS_IOP_MMHG: 17
IOP_METHOD: TONOPEN

## 2017-10-16 ASSESSMENT — EXTERNAL EXAM - RIGHT EYE: OD_EXAM: NORMAL

## 2017-10-16 NOTE — MR AVS SNAPSHOT
After Visit Summary   10/16/2017    Suma SIFUENTES    MRN: [id number]           Patient Information     Date Of Birth          1985        Visit Information        Provider Department      10/16/2017 12:15 PM Marta Land MD Eye Clinic        Today's Diagnoses     Uveitis    -  1      Care Instructions    Decrease prednisone  Per Dr. Perez recs (recommend 20 mg/day x 2 weeks, then 15 mg/day x 2 weeks, then 10 mg/day - repeat fluorescein angiography at 10 mg/day or sooner if indicated)            Follow-ups after your visit        Your next 10 appointments already scheduled     2017 12:30 PM CST   RETURN UVEITIS with Shannon Dominguez MD   Eye Clinic ([facility])    [facility]  [address]  [facility] [id number] [phone]              Who to contact     Please call your clinic at [phone] to:    Ask questions about your health    Make or cancel appointments    Discuss your medicines    Learn about your test results    Speak to your doctor   If you have compliments or concerns about an experience at your clinic, or if you wish to file a complaint, please contact [facility] Patient Relations at [phone] or email us at [email or web address]         Additional Information About Your Visit        MyChart Information     [employer] is an electronic gateway that provides easy, online access to your medical records. With [employer], you can request a clinic appointment, read your test results, renew a prescription or communicate with your care team.     To sign up for [employer] visit the website at [email or web address]   You will be asked to enter the access code listed below, as well as some personal information. Please follow the directions to create your username and password.     Your access code is: [email or web address]  Expires: 2017  6:30 AM     Your access code will  in 90  days. If you need help or a new code, please contact your [facility] or call [phone] for assistance.        Care EveryWhere ID     This is your Care EveryWhere ID. This could be used by other organizations to access your [facility] medical records  [id number]         Blood Pressure from Last 3 Encounters:   No data found for BP    Weight from Last 3 Encounters:   No data found for Wt              We Performed the Following     OCT Retina Spectralis OU (both eyes)        Primary Care Provider Office Phone # Fax #    Leilani Rangel [phone]       [facility] [address]  [facility] [address]        Equal Access to Services     [facility] Center[facility]NANDA : Hadii aad ku hadasho Soomaali, waaxda luqadaha, qaybta kaalmada adeegyada, waxay idiin hayaravindn ginny ambrose . So [facility] [phone].    ATENCIÓN: Si habla español, tiene a rothman disposición servicios gratuitos de asistencia lingüística. Llame al [phone].    We comply with applicable federal civil rights laws and [place] laws. We do not discriminate on the basis of race, color, national origin, age, disability, sex, sexual orientation, or gender identity.            Thank you!     Thank you for choosing EYE CLINIC  for your care. Our goal is always to provide you with excellent care. Hearing back from our patients is one way we can continue to improve our services. Please take a few minutes to complete the written survey that you may receive in the mail after your visit with us. Thank you!             Your Updated Medication List - Protect others around you: Learn how to safely use, store and throw away your medicines at www.disposemymeds.org.          This list is accurate as of: 10/16/17  1:48 PM.  Always use your most recent med list.                   Brand Name Dispense Instructions for use Diagnosis    folic acid 1 MG tablet    FOLVITE     Take 1 mg by mouth        ketorolac tromethamine 0.4 % Soln  ophthalmic solution    ACULAR-LS    1 Bottle    Place 1 drop Into the left eye 3 times daily    Intermediate uveitis, bilateral       methotrexate 2.5 MG tablet CHEMO      3 tablets by mouth once a week. Increase by 2 tablets every week until at 10 tablets once a week.        predniSONE 20 MG tablet    DELTASONE    180 tablet    Take 3 tablets (60 mg) by mouth daily    Intermediate uveitis, bilateral, Cystoid macular degeneration of retina, left

## 2017-10-16 NOTE — LETTER
10/16/2017       RE: Suma SIFUENTES  [address] [facility] [address]     Dear Colleague,    Thank you for referring your patient, Suma SIFUENTES, to the EYE CLINIC at [facility]. Please see a copy of my visit note below.    CC: uveitis follow up    HPI: Suma SIFUENTES is a 32 year old  female here for uveitis follow up.   Now on oral prednisone 20 mg/day with slow tapering, she reports mild improvement in vision left eye. Right eye vision remains at baseline.  Started MTX 2.5 x3 pills once a week, with slow increase By Dr. Tapia - Rheumatologist from [place]    Ocular history:   1. 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 In July 2017 she developed left eye ache. Although this symptom was different than her symptoms with IIH, she was concerned that the IIH was returning. She was initially evaluated by optometry and then referred to Dr. Chris Aparicio, who referred her to [facility] for further evaluation of schisis. She saw Dr. Land 8.31.17 and was found to have retinal vasculitis.   2. Retinoschisis cavities left eye. S/p laser barricade left eye 8.31.17 (Shanda).  3. IIH diagnosed in 2015 but sympoms started in 2012, treated with 4 months diamox (had side effects: joint swelling and pain). Symptoms resolved near the end of pregnancy.  4. History of red left eye about 10 years ago, treated with a short course of topical corticosteroids, patient unsure of diagnosis.     Medical history:  1. 2 uncomplicated pregnancies (vaginal delivery), although blood pressure was slightly elevated at the end of more recent pregnancy.  2. Not currently pregnant or breastfeeding.  3. Mirena IUD - not planning pregnancy soon.  4. Negative for diabetes mellitus.  5. Scalp cyst.     Review of systems: Unremarkable. Specifically, negative for focal numbness, tingling, episodes of vision loss or diplopia. Negative for genital ulcers and pathergy  reaction.    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     Laboratory/Imaging Results:  MRI brain/orbits 9.14.17: unremarkable.  Labs 8.31.17: Quantiferon, RPR, Treponema pallidum ab, Lyme, HIV, Toxoplasma IgM/IgG, Bartonella henselae IgM/IgG, anti-Anaplsma phagocytophilum (HGA) IgM/IgG, urinary beta-2 microglobulin, anti-MPO, anti-pro3, ACE, ESR, CRP, and HLA-B27 all normal/negative. HSV1 IgG positive, HSV2 IgG negative. Complete blood count (CBC) with diff normal. Complete metabolic panel (CMP) unremarkable except for slightly elevated glucose (116 but non-fasting).    Ocular Imaging:  10.16.17  mac OCT:  Right eye: within normal limits, stable   Left eye: mild epiretinal membrane, intraretinal fluid essentially resolved.   Inferior Optical Coherence Tomography thorough the schisis confirmed schisis without  progression    8.31.17 Optos fluorescein angiography shows telangiectatic, leaky vessels in areas of schisis cysts left eye. There is focal venuolar leakage and mild optic nerve head leakage left eye > right eye. Also posterior pole venuolar leakage left eye.    Impression/Plan:  1. 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 Macular intraretinal fluid resolved with 60 mg/day prednisone   - Decrease prednisone  Per Dr. Ana barkley (recommend 20 mg/day x 2 weeks, then 15 mg/day x 2 weeks, then 10 mg/day - repeat fluorescein angiography at 10 mg/day or sooner if indicated)   - Supplement with calcium/vitamin D   - May consider periocular Kenalog as well, if needed for inflammatory control, mitchell if patient is intolerant of oral prednisone (though she is tolerating it well at present)   - patient started methotrexate (goal dose 20-25 mg/wk). If her inflammation does not respond to the first agent, then would recommend or CellCept (goal dose 1000 - 1500 mg twice a day) or Humira.   Patient will follow up with Dr. Tapia  (rheumatologist, previously saw in [phone]) Nov 27, 2017    2. 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 Does not extend posterior to laser.   - doing well no extension of the schisis   - continue to monitor     3. Cystoid macular edema left eye, likely secondary to #1.   - Improvement with oral prednisone, as described above    Return to clinic:  1. Patient without  Recurrent inflammation, may continue oral prednisone taper  2. May continue follow up with Dr. Alberto smith uveitis clinic: V/T/D/mac OCT/Optos fluorescein angiography transit left eye.  3. Will follow up with retina as needed     Again, thank you for allowing me to participate in the care of your patient.      Sincerely,    MD Marta Boss MD  .  Retina Service   Department of Ophthalmology and Visual Neurosciences   [facility]  Phone: [phone]   Fax: [phone]

## 2017-10-16 NOTE — PATIENT INSTRUCTIONS
Decrease prednisone  Per Dr. Perez recs (recommend 20 mg/day x 2 weeks, then 15 mg/day x 2 weeks, then 10 mg/day - repeat fluorescein angiography at 10 mg/day or sooner if indicated)

## 2017-10-16 NOTE — PROGRESS NOTES
CC: uveitis follow up    HPI: Suma SIFUENTES is a 32 year old  female here for uveitis follow up.   Now on oral prednisone 20 mg/day with slow tapering, she reports mild improvement in vision left eye. Right eye vision remains at baseline.  Started MTX 2.5 x3 pills once a week, with slow increase By Dr. Tapia - Rheumatologist from [place]    Ocular history:   1. 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 In July 2017 she developed left eye ache. Although this symptom was different than her symptoms with IIH, she was concerned that the IIH was returning. She was initially evaluated by optometry and then referred to Dr. Chris Aparicio, who referred her to [facility] for further evaluation of schisis. She saw Dr. Land 8.31.17 and was found to have retinal vasculitis.   2. Retinoschisis cavities left eye. S/p laser barricade left eye 8.31.17 (Shanda).  3. IIH diagnosed in 2015 but sympoms started in 2012, treated with 4 months diamox (had side effects: joint swelling and pain). Symptoms resolved near the end of pregnancy.  4. History of red left eye about 10 years ago, treated with a short course of topical corticosteroids, patient unsure of diagnosis.     Medical history:  1. 2 uncomplicated pregnancies (vaginal delivery), although blood pressure was slightly elevated at the end of more recent pregnancy.  2. Not currently pregnant or breastfeeding.  3. Mirena IUD - not planning pregnancy soon.  4. Negative for diabetes mellitus.  5. Scalp cyst.     Review of systems: Unremarkable. Specifically, negative for focal numbness, tingling, episodes of vision loss or diplopia. Negative for genital ulcers and pathergy reaction.    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 Alcohol use: ~one beverage monthly.     Laboratory/Imaging Results:  MRI brain/orbits 9.14.17: unremarkable.  Labs 8.31.17: Quantiferon, RPR, Treponema pallidum ab, Lyme, HIV, Toxoplasma IgM/IgG, Bartonella  henselae IgM/IgG, anti-Anaplsma phagocytophilum (HGA) IgM/IgG, urinary beta-2 microglobulin, anti-MPO, anti-pro3, ACE, ESR, CRP, and HLA-B27 all normal/negative. HSV1 IgG positive, HSV2 IgG negative. Complete blood count (CBC) with diff normal. Complete metabolic panel (CMP) unremarkable except for slightly elevated glucose (116 but non-fasting).    Ocular Imaging:  10.16.17  mac OCT:  Right eye: within normal limits, stable   Left eye: mild epiretinal membrane, intraretinal fluid essentially resolved.   Inferior Optical Coherence Tomography thorough the schisis confirmed schisis without  progression    8.31.17 Optos fluorescein angiography shows telangiectatic, leaky vessels in areas of schisis cysts left eye. There is focal venuolar leakage and mild optic nerve head leakage left eye > right eye. Also posterior pole venuolar leakage left eye.    Impression/Plan:  1. 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 Macular intraretinal fluid resolved with 60 mg/day prednisone   - Decrease prednisone  Per Dr. Ana barkley (recommend 20 mg/day x 2 weeks, then 15 mg/day x 2 weeks, then 10 mg/day - repeat fluorescein angiography at 10 mg/day or sooner if indicated)   - Supplement with calcium/vitamin D   - May consider periocular Kenalog as well, if needed for inflammatory control, mitchell if patient is intolerant of oral prednisone (though she is tolerating it well at present)   - patient started methotrexate (goal dose 20-25 mg/wk). If her inflammation does not respond to the first agent, then would recommend or CellCept (goal dose 1000 - 1500 mg twice a day) or Humira.   Patient will follow up with Dr. Tapia (rheumatologist, previously saw in [id number]) Nov 27, 2017    2. 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 Does not extend posterior to laser.   - doing well no extension of the schisis   - continue to monitor     3. Cystoid macular edema left eye, likely secondary to  #1.   - Improvement with oral prednisone, as described above    Return to clinic:  1. Patient without  Recurrent inflammation, may continue oral prednisone taper  2. May continue follow up with Dr. Alberto smith uveitis clinic: V/T/D/mac OCT/Optos fluorescein angiography transit left eye.  3. Will follow up with retina as needed     ~~~~~~~~~~~~~~~~~~~~~~~~~~~~~~~~~~   Complete documentation of historical and exam elements from today's encounter can be found in the full encounter summary report (not reduplicated in this progress note).  I personally obtained the chief complaint(s) and history of present illness.  I confirmed and edited as necessary the review of systems, past medical/surgical history, family history, social history, and examination findings as documented by others; and I examined the patient myself.  I personally reviewed the relevant tests, images, and reports as documented above.  I formulated and edited as necessary the assessment and plan and discussed the findings and management plan with the patient and family    Marta Land MD  .  Retina Service   Department of Ophthalmology and Visual Neurosciences   [facility]  Phone: [phone]   Fax: [phone]

## 2017-11-17 ENCOUNTER — OFFICE VISIT (OUTPATIENT)
Dept: OPHTHALMOLOGY | Facility: CLINIC | Age: 32
End: 2017-11-17
Attending: OPHTHALMOLOGY
Payer: COMMERCIAL

## 2017-11-17 DIAGNOSIS — H35.352 CYSTOID MACULAR DEGENERATION OF RETINA, LEFT: ICD-10-CM

## 2017-11-17 DIAGNOSIS — H30.23 INTERMEDIATE UVEITIS, BILATERAL: Primary | ICD-10-CM

## 2017-11-17 PROCEDURE — 92235 FLUORESCEIN ANGRPH MLTIFRAME: CPT | Mod: ZF | Performed by: OPHTHALMOLOGY

## 2017-11-17 PROCEDURE — 99213 OFFICE O/P EST LOW 20 MIN: CPT | Mod: ZF

## 2017-11-17 PROCEDURE — 92134 CPTRZ OPH DX IMG PST SGM RTA: CPT | Mod: ZF | Performed by: OPHTHALMOLOGY

## 2017-11-17 RX ORDER — PREDNISONE 5 MG/1
10 TABLET ORAL DAILY
Qty: 60 TABLET | Refills: 1 | Status: SHIPPED | OUTPATIENT
Start: 2017-11-17 | End: 2018-01-16

## 2017-11-17 ASSESSMENT — SLIT LAMP EXAM - LIDS
COMMENTS: NORMAL
COMMENTS: NORMAL

## 2017-11-17 ASSESSMENT — EXTERNAL EXAM - LEFT EYE: OS_EXAM: NORMAL

## 2017-11-17 ASSESSMENT — CONF VISUAL FIELD
OD_NORMAL: 1
OS_NORMAL: 1
METHOD: COUNTING FINGERS

## 2017-11-17 ASSESSMENT — TONOMETRY
IOP_METHOD: TONOPEN
OD_IOP_MMHG: 16
OS_IOP_MMHG: 15

## 2017-11-17 ASSESSMENT — CUP TO DISC RATIO
OD_RATIO: 0.1
OS_RATIO: 0.1

## 2017-11-17 ASSESSMENT — VISUAL ACUITY
OS_SC: 20/50
OD_SC: 20/40
METHOD: SNELLEN - LINEAR
OD_PH_SC: 20/20
OS_PH_SC: 20/40

## 2017-11-17 ASSESSMENT — EXTERNAL EXAM - RIGHT EYE: OD_EXAM: NORMAL

## 2017-11-17 NOTE — LETTER
November 17, 2017      Marta Ladn MD  [address] [place] [address]      RE: Suma Sifuentes  [address] [facility] [address]       Dear Colleagues:    Thank you for referring your patient, Suma Sifuentes, to the EYE CLINIC at [facility]. Please see a copy of my visit note below.    CC: Uveitis follow up    HPI: Suma SIFUENTES is a 32 year old  female here for uveitis follow up. Now on oral prednisone 10 mg/day with slow tapering. She reports no changes with eyes since last eye clinic visit. Her eye pain has resolved and vision left eye have improved since starting prednisone.    Ocular medications: prednisone 10 mg/day (tapering, started at 60 mg/day 9.16.17), oral methotrexate 25 mg/wk (started 10.16.17, increased to 25 mg/wk 11.13.17) + folic acid. IMT managed by Dr. Tapia ([facility]).    Ocular history:   1. 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 In July 2017 she developed left eye ache. Although this symptom was different than her symptoms with IIH, she was concerned that the IIH was returning. She was initially evaluated by optometry and then referred to Dr. Chris Aparicio, who referred her to [facility] for further evaluation of schisis. She saw Dr. Land 8.31.17 and was found to have retinal vasculitis.   2. Retinoschisis cavities left eye. S/p laser barricade left eye 8.31.17 (Shanda).  3. IIH diagnosed in 2015 but symptoms started in 2012, treated with 4 months diamox (had side effects: joint swelling and pain). Symptoms resolved near the end of pregnancy.  4. History of red left eye about 10 years ago, treated with a short course of topical corticosteroids, patient unsure of diagnosis.     Medical history:  1. 2 uncomplicated pregnancies (vaginal delivery), although blood pressure was slightly elevated at the end of more recent pregnancy.  2. Not currently pregnant or breastfeeding.  3. Mirena IUD -  not planning pregnancy soon.  4. Negative for diabetes mellitus.  5. Scalp cyst.     Review of systems: Unremarkable. Specifically, negative for focal numbness, tingling, episodes of vision loss or diplopia. Negative for genital ulcers and pathergy reaction.    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     Laboratory/Imaging Results:  MRI brain/orbits 17: unremarkable.  Labs 17: Quantiferon, RPR, Treponema pallidum ab, Lyme, HIV, Toxoplasma IgM/IgG, Bartonella henselae IgM/IgG, anti-Anaplsma phagocytophilum (HGA) IgM/IgG, urinary beta-2 microglobulin, anti-MPO, anti-pro3, ACE, ESR, CRP, and HLA-B27 all normal/negative. HSV1 IgG positive, HSV2 IgG negative. Complete blood count (CBC) with diff normal. Complete metabolic panel (CMP) unremarkable except for slightly elevated glucose (116 but non-fasting).    Ocular Imagin17 mac OCT:  Right eye: within normal limits, stable vs 10.16.17  Left eye: mild epiretinal membrane, intraretinal fluid essentially resolved, central retinal thickening questionably worse (+6) vs 10.16.17    Optos fluorescein angiography 17 shows marked improvement of leakage left eye compared to 17.    Impression/Plan:  1. 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 Retinal vasculitis and cystoid macular edema improved today on 10 mg/day prednisone and 25 mg/wk methotrexate (started MTX 1 month ago).   - Continue methotrexate 25 mg/wk with Dr. Tapia; patient is tolerating well. If her inflammation                 does not respond to MTX, then would recommend CellCept (goal dose 1000 - 1500 mg twice a                 day) or Humira.   - Prednisone 10 mg/day for 4 more weeks, then 7.5 mg/day for 2 weeks, then 5 mg/day for 2                 weeks, then 2.5 mg/day for 2 weeks, then stop.   - Supplement with calcium/vitamin D   - May consider periocular Kenalog as well, if needed for  inflammatory control, mitchell if patient is                 intolerant of oral prednisone (though she is tolerating it well at present)    2. 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 Does not extend posterior to laser.   - Continue to monitor     3. Cystoid macular edema left eye, likely secondary to #1.   - Improvement with oral prednisone, as described above        Return to uveitis clinic in 6 weeks: V/T/D/mac OCT  Plan fluorescein angiography at following visit      Again, thank you for allowing me to participate in the care of your patient.        Sincerely,    Shannon Dominguez MD    Cc: MD Eldon Varner MD Andrew Jordan, MD

## 2017-11-17 NOTE — NURSING NOTE
Chief Complaints and History of Present Illnesses   Patient presents with     Follow Up For     Uveitis      HPI    Affected eye(s):  Both   Symptoms:        Frequency:  Constant       Do you have eye pain now?:  No      Comments:  States va is the same since last visit  No red watery or dry  No changes in the floaters  Majo CHAIREZ 12:32 PM November 17, 2017

## 2017-11-17 NOTE — PATIENT INSTRUCTIONS
Continue methotrexate 25 mg/wk  Decrease prednisone: 10 mg/day for 4 more weeks, then 7.5 mg/day for 2 weeks, then 5 mg/day for 2 weeks, then 2.5 mg/day for 2 weeks then stop.

## 2017-11-17 NOTE — LETTER
11/17/2017      RE: Suma Sifuentes  [address] [facility] [address]       CC: uveitis follow up    HPI: Suma SIFUENTES is a 32 year old  female here for uveitis follow up. Now on oral prednisone 10 mg/day with slow tapering. She reports no changes with eyes since last eye clinic visit. Her eye pain has resolved and vision left eye have improved since starting prednisone.    Ocular medications: prednisone 10 mg/day (tapering, started at 60 mg/day 9.16.17), oral methotrexate 25 mg/wk (started 10.16.17, increased to 25 mg/wk 11.13.17) + folic acid. IMT managed by Dr. Tapia ([facility] [place]).    Ocular history:   1. 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 In July 2017 she developed left eye ache. Although this symptom was different than her symptoms with IIH, she was concerned that the IIH was returning. She was initially evaluated by optometry and then referred to Dr. hCris Aparicio, who referred her to [facility] for further evaluation of schisis. She saw Dr. Land 8.31.17 and was found to have retinal vasculitis.   2. Retinoschisis cavities left eye. S/p laser barricade left eye 8.31.17 (Shanda).  3. IIH diagnosed in 2015 but sympoms started in 2012, treated with 4 months diamox (had side effects: joint swelling and pain). Symptoms resolved near the end of pregnancy.  4. History of red left eye about 10 years ago, treated with a short course of topical corticosteroids, patient unsure of diagnosis.     Medical history:  1. 2 uncomplicated pregnancies (vaginal delivery), although blood pressure was slightly elevated at the end of more recent pregnancy.  2. Not currently pregnant or breastfeeding.  3. Mirena IUD - not planning pregnancy soon.  4. Negative for diabetes mellitus.  5. Scalp cyst.     Review of systems: Unremarkable. Specifically, negative for focal numbness, tingling, episodes of vision loss or diplopia. Negative for genital ulcers and pathergy reaction.    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     Laboratory/Imaging Results:  MRI brain/orbits 17: unremarkable.  Labs 17: Quantiferon, RPR, Treponema pallidum ab, Lyme, HIV, Toxoplasma IgM/IgG, Bartonella henselae IgM/IgG, anti-Anaplsma phagocytophilum (HGA) IgM/IgG, urinary beta-2 microglobulin, anti-MPO, anti-pro3, ACE, ESR, CRP, and HLA-B27 all normal/negative. HSV1 IgG positive, HSV2 IgG negative. Complete blood count (CBC) with diff normal. Complete metabolic panel (CMP) unremarkable except for slightly elevated glucose (116 but non-fasting).    Ocular Imagin17 mac OCT:  Right eye: within normal limits, stable vs 10.16.17  Left eye: mild epiretinal membrane, intraretinal fluid essentially resolved, central retinal thickening questionably worse (+6) vs 10.16.17    Optos fluorescein angiography 17 shows marked improvement of leakage left eye compared to 17.    Impression/Plan:  1. 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 Retinal vasculitis and cystoid macular edema improved today on 10 mg/day prednisone and 25 mg/wk methotrexate (started MTX 1 month ago).   - Continue methotrexate 25 mg/wk with Dr. Tapia; patient is tolerating well. If her inflammation does not respond to MTX, then would recommend CellCept (goal dose 1000 - 1500 mg twice a day) or Humira.   - Prednisone 10 mg/day for 4 more weeks, then 7.5 mg/day for 2 weeks, then 5 mg/day for 2 weeks, then 2.5 mg/day for 2 weeks, then stop.   - Supplement with calcium/vitamin D   - May consider periocular Kenalog as well, if needed for inflammatory control, mitchell if patient is intolerant of oral prednisone (though she is tolerating it well at present)    2. 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 Does not extend posterior to laser.   - Continue to monitor     3. Cystoid macular edema left eye, likely secondary to #1.   - Improvement with  oral prednisone, as described above    Return to uveitis clinic in 6 weeks: V/T/D/mac OCT  Plan fluorescein angiography at following visit    Attending Physician Attestation:  Complete documentation of historical and exam elements from today's encounter can be found in the full encounter summary report (not reduplicated in this progress note).  I personally obtained the chief complaint(s) and history of present illness.  I confirmed and edited as necessary the review of systems, past medical/surgical history, family history, social history, and examination findings as documented by others; and I examined the patient myself.  I personally reviewed the relevant tests, images, and reports as documented above.  I formulated and edited as necessary the assessment and plan and discussed the findings and management plan with the patient and family.  - Shannon Dominguez M.D.          Shannon Dominguez MD

## 2017-11-17 NOTE — MR AVS SNAPSHOT
After Visit Summary   11/17/2017    Suma Schulz    MRN: [id number]           Patient Information     Date Of Birth          1985        Visit Information        Provider Department      11/17/2017 12:30 PM Shannon Dominguez MD Eye Clinic        Today's Diagnoses     Intermediate uveitis, bilateral    -  1    Cystoid macular degeneration of retina, left          Care Instructions    Continue methotrexate 25 mg/wk  Decrease prednisone: 10 mg/day for 4 more weeks, then 7.5 mg/day for 2 weeks, then 5 mg/day for 2 weeks, then 2.5 mg/day for 2 weeks then stop.          Follow-ups after your visit        Follow-up notes from your care team     Return for Follow Up in 6 weeks and 12 weeks.      Your next 10 appointments already scheduled     Jan 05, 2018  2:00 PM CST   RETURN UVEITIS with Shannon Dominguez MD   Eye Clinic ([facility])    [address]  [address]  [facility] [address]   [phone]            Feb 16, 2018 12:00 PM CST   RETURN UVEITIS with Shannon Dominguez MD   Eye Clinic ([facility])    [address]  [address]  [facility] [address]   [phone]              Who to contact     Please call your clinic at [phone] to:    Ask questions about your health    Make or cancel appointments    Discuss your medicines    Learn about your test results    Speak to your doctor   If you have compliments or concerns about an experience at your clinic, or if you wish to file a complaint, please contact [facility] Patient Relations at [phone] or email us at [email or web address]         Additional Information About Your Visit        MyChart Information     Nourish is an electronic gateway that provides easy, online access to your medical records. With Nourish, you can request a clinic appointment, read your test results, renew a prescription or  communicate with your care team.     To sign up for [employer] visit the website at [email or web address]   You will be asked to enter the access code listed below, as well as some personal information. Please follow the directions to create your username and password.     Your access code is: [email or web address]  Expires: 2017  5:30 AM     Your access code will  in 90 days. If you need help or a new code, please contact your [facility] or call [phone] for assistance.        Care EveryWhere ID     This is your Care EveryWhere ID. This could be used by other organizations to access your [place] medical records  [id number]         Blood Pressure from Last 3 Encounters:   No data found for BP    Weight from Last 3 Encounters:   No data found for Wt              We Performed the Following     Fluorescein Angiography OU (both eyes)     OCT Retina Spectralis OU (both eyes)          Today's Medication Changes          These changes are accurate as of: 17  2:10 PM.  If you have any questions, ask your nurse or doctor.               These medicines have changed or have updated prescriptions.        Dose/Directions    * predniSONE 20 MG tablet   Commonly known as:  DELTASONE   This may have changed:  Another medication with the same name was added. Make sure you understand how and when to take each.   Used for:  Intermediate uveitis, bilateral, Cystoid macular degeneration of retina, left   Changed by:  Shannon Dominguez MD        Dose:  60 mg   Take 3 tablets (60 mg) by mouth daily   Quantity:  180 tablet   Refills:  0       * predniSONE 5 MG tablet   Commonly known as:  DELTASONE   This may have changed:  You were already taking a medication with the same name, and this prescription was added. Make sure you understand how and when to take each.   Used for:  Intermediate uveitis, bilateral, Cystoid macular degeneration of retina, left   Changed by:  Shannon Dominguez  MD Tamika        Dose:  10 mg   Take 2 tablets (10 mg) by mouth daily   Quantity:  60 tablet   Refills:  1       * Notice:  This list has 2 medication(s) that are the same as other medications prescribed for you. Read the directions carefully, and ask your doctor or other care provider to review them with you.         Where to get your medicines      These medications were sent to [facility]/pharmacy #[id number] - [place], [place] - [address]  [address] [facility] [address] [facility] [place] [address]     Phone:  [phone]     predniSONE 5 MG tablet                Primary Care Provider Office Phone # Fax #    Leilani Rangel [phone]       [facility] [address] [facility] [address]        Equal Access to Services     GAEL PAGE : Yoko godinezo Jr, waaxda luqadaha, qaybta kaalmada adeegyada, erfa ambrose . [facility] [phone].    ATENCIÓN: Si habla español, tiene a rothman disposición servicios gratuitos de asistencia lingüística. [facility] [phone].    We comply with applicable federal civil rights laws and [place] laws. We do not discriminate on the basis of race, color, national origin, age, disability, sex, sexual orientation, or gender identity.            Thank you!     Thank you for choosing EYE CLINIC  for your care. Our goal is always to provide you with excellent care. Hearing back from our patients is one way we can continue to improve our services. Please take a few minutes to complete the written survey that you may receive in the mail after your visit with us. Thank you!             Your Updated Medication List - Protect others around you: Learn how to safely use, store and throw away your medicines at www.disposemymeds.org.          This list is accurate as of: 11/17/17  2:10 PM.  Always use your most recent med list.                   Brand Name Dispense Instructions for use Diagnosis    folic acid 1 MG tablet    FOLVITE     Take 1 mg by mouth         methotrexate 2.5 MG tablet CHEMO      3 tablets by mouth once a week. Increase by 2 tablets every week until at 10 tablets once a week.        * predniSONE 20 MG tablet    DELTASONE    180 tablet    Take 3 tablets (60 mg) by mouth daily    Intermediate uveitis, bilateral, Cystoid macular degeneration of retina, left       * predniSONE 5 MG tablet    DELTASONE    60 tablet    Take 2 tablets (10 mg) by mouth daily    Intermediate uveitis, bilateral, Cystoid macular degeneration of retina, left       * Notice:  This list has 2 medication(s) that are the same as other medications prescribed for you. Read the directions carefully, and ask your doctor or other care provider to review them with you.

## 2017-11-17 NOTE — PROGRESS NOTES
CC: uveitis follow up    HPI: Suma SIFUENTES is a 32 year old  female here for uveitis follow up. Now on oral prednisone 10 mg/day with slow tapering. She reports no changes with eyes since last eye clinic visit. Her eye pain has resolved and vision left eye have improved since starting prednisone.    Ocular medications: prednisone 10 mg/day (tapering, started at 60 mg/day 9.16.17), oral methotrexate 25 mg/wk (started 10.16.17, increased to 25 mg/wk 11.13.17) + folic acid. IMT managed by Dr. Tapia ([facility]).    Ocular history:   1. 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 In July 2017 she developed left eye ache. Although this symptom was different than her symptoms with IIH, she was concerned that the IIH was returning. She was initially evaluated by optometry and then referred to Dr. Chris Aparicio, who referred her to [facility] for further evaluation of schisis. She saw Dr. Land 8.31.17 and was found to have retinal vasculitis.   2. Retinoschisis cavities left eye. S/p laser barricade left eye 8.31.17 (Shanda).  3. IIH diagnosed in 2015 but sympoms started in 2012, treated with 4 months diamox (had side effects: joint swelling and pain). Symptoms resolved near the end of pregnancy.  4. History of red left eye about 10 years ago, treated with a short course of topical corticosteroids, patient unsure of diagnosis.     Medical history:  1. 2 uncomplicated pregnancies (vaginal delivery), although blood pressure was slightly elevated at the end of more recent pregnancy.  2. Not currently pregnant or breastfeeding.  3. Mirena IUD - not planning pregnancy soon.  4. Negative for diabetes mellitus.  5. Scalp cyst.     Review of systems: Unremarkable. Specifically, negative for focal numbness, tingling, episodes of vision loss or diplopia. Negative for genital ulcers and pathergy reaction.    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 Alcohol use: ~one beverage  monthly.     Laboratory/Imaging Results:  MRI brain/orbits 17: unremarkable.  Labs 17: Quantiferon, RPR, Treponema pallidum ab, Lyme, HIV, Toxoplasma IgM/IgG, Bartonella henselae IgM/IgG, anti-Anaplsma phagocytophilum (HGA) IgM/IgG, urinary beta-2 microglobulin, anti-MPO, anti-pro3, ACE, ESR, CRP, and HLA-B27 all normal/negative. HSV1 IgG positive, HSV2 IgG negative. Complete blood count (CBC) with diff normal. Complete metabolic panel (CMP) unremarkable except for slightly elevated glucose (116 but non-fasting).    Ocular Imagin17 mac OCT:  Right eye: within normal limits, stable vs 10.16.17  Left eye: mild epiretinal membrane, intraretinal fluid essentially resolved, central retinal thickening questionably worse (+6) vs 10.16.17    Optos fluorescein angiography 17 shows marked improvement of leakage left eye compared to 17.    Impression/Plan:  1. 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 Retinal vasculitis and cystoid macular edema improved today on 10 mg/day prednisone and 25 mg/wk methotrexate (started MTX 1 month ago).   - Continue methotrexate 25 mg/wk with Dr. Tapia; patient is tolerating well. If her inflammation does not respond to MTX, then would recommend CellCept (goal dose 1000 - 1500 mg twice a day) or Humira.   - Prednisone 10 mg/day for 4 more weeks, then 7.5 mg/day for 2 weeks, then 5 mg/day for 2 weeks, then 2.5 mg/day for 2 weeks, then stop.   - Supplement with calcium/vitamin D   - May consider periocular Kenalog as well, if needed for inflammatory control, mitchell if patient is intolerant of oral prednisone (though she is tolerating it well at present)    2. 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 Does not extend posterior to laser.   - Continue to monitor     3. Cystoid macular edema left eye, likely secondary to #1.   - Improvement with oral prednisone, as described above    Return to uveitis clinic in 6 weeks:  V/T/D/mac OCT  Plan fluorescein angiography at following visit    Attending Physician Attestation:  Complete documentation of historical and exam elements from today's encounter can be found in the full encounter summary report (not reduplicated in this progress note).  I personally obtained the chief complaint(s) and history of present illness.  I confirmed and edited as necessary the review of systems, past medical/surgical history, family history, social history, and examination findings as documented by others; and I examined the patient myself.  I personally reviewed the relevant tests, images, and reports as documented above.  I formulated and edited as necessary the assessment and plan and discussed the findings and management plan with the patient and family.  - Shannon Dominguez M.D.

## 2018-01-05 ENCOUNTER — OFFICE VISIT (OUTPATIENT)
Dept: OPHTHALMOLOGY | Facility: CLINIC | Age: 33
End: 2018-01-05
Attending: OPHTHALMOLOGY
Payer: COMMERCIAL

## 2018-01-05 DIAGNOSIS — H35.352 CYSTOID MACULAR DEGENERATION OF RETINA, LEFT: ICD-10-CM

## 2018-01-05 DIAGNOSIS — H30.23 INTERMEDIATE UVEITIS, BILATERAL: Primary | ICD-10-CM

## 2018-01-05 PROCEDURE — G0463 HOSPITAL OUTPT CLINIC VISIT: HCPCS | Mod: ZF

## 2018-01-05 PROCEDURE — 92134 CPTRZ OPH DX IMG PST SGM RTA: CPT | Mod: ZF | Performed by: OPHTHALMOLOGY

## 2018-01-05 ASSESSMENT — SLIT LAMP EXAM - LIDS
COMMENTS: NORMAL
COMMENTS: NORMAL

## 2018-01-05 ASSESSMENT — CUP TO DISC RATIO
OS_RATIO: 0.1
OD_RATIO: 0.1

## 2018-01-05 ASSESSMENT — REFRACTION_WEARINGRX
OS_SPHERE: -1.50
SPECS_TYPE: SVL
OD_CYLINDER: +0.50
OS_CYLINDER: +1.50
OS_AXIS: 095
OD_SPHERE: -0.50
OD_AXIS: 087

## 2018-01-05 ASSESSMENT — VISUAL ACUITY
OD_CC: 20/15
CORRECTION_TYPE: GLASSES
METHOD: SNELLEN - LINEAR
OS_CC+: +2
OS_CC: 20/25

## 2018-01-05 ASSESSMENT — TONOMETRY
OD_IOP_MMHG: 17
IOP_METHOD: TONOPEN
OS_IOP_MMHG: 14

## 2018-01-05 ASSESSMENT — CONF VISUAL FIELD
OS_NORMAL: 1
OD_NORMAL: 1

## 2018-01-05 ASSESSMENT — EXTERNAL EXAM - RIGHT EYE: OD_EXAM: NORMAL

## 2018-01-05 ASSESSMENT — EXTERNAL EXAM - LEFT EYE: OS_EXAM: NORMAL

## 2018-01-05 NOTE — MR AVS SNAPSHOT
After Visit Summary   1/5/2018    Suma Schulz    MRN: [id number]           Patient Information     Date Of Birth          1985        Visit Information        Provider Department      1/5/2018 2:00 PM Shannon Dominguez MD [facility]        Today's Diagnoses     Intermediate uveitis, bilateral        Cystoid macular degeneration of retina, left          Care Instructions    Oral prednisone: 7.5 mg/day for 3 weeks then 5 mg/day for 3 weeks then 2.5 mg/day for 3 weeks then stop  Continue methotrexate 25 mg/wk          Follow-ups after your visit        Follow-up notes from your care team     Return in about 3 months (around 4/5/2018) for Follow Up.      Your next 10 appointments already scheduled     Feb 23, 2018 12:30 PM CST   RETURN UVEITIS with Shannon Dominguez MD   Eye Clinic ([facility])    [address]  [address]  [facility] [address]   [phone]            Apr 06, 2018 12:00 PM CDT   RETURN UVEITIS with Shannon Dominguez MD   Eye Clinic ([facility])    [address]  [address]  [facility] [address]   [phone]              Who to contact     Please call your clinic at [phone] to:    Ask questions about your health    Make or cancel appointments    Discuss your medicines    Learn about your test results    Speak to your doctor   If you have compliments or concerns about an experience at your clinic, or if you wish to file a complaint, please contact [facility] Patient Relations at [phone] or email us at [email or web address]         Additional Information About Your Visit        MyChart Information     [employer] is an electronic gateway that provides easy, online access to your medical records. With [employer], you can request a clinic appointment, read your test results, renew a prescription or communicate with your care team.     To sign up  for MyChart visit the website at [email or web address]   You will be asked to enter the access code listed below, as well as some personal information. Please follow the directions to create your username and password.     Your access code is: [email or web address]  Expires: 3/22/2018  6:31 AM     Your access code will  in 90 days. If you need help or a new code, please contact your [facility] or call [phone] for assistance.        Care EveryWhere ID     This is your Care EveryWhere ID. This could be used by other organizations to access your [place] medical records  [id number]         Blood Pressure from Last 3 Encounters:   No data found for BP    Weight from Last 3 Encounters:   No data found for Wt              We Performed the Following     OCT Retina Spectralis OU (both eyes)        Primary Care Provider Office Phone # Fax #    Leilani COATS [place] [phone]       [facility] [address] [facility] [address]        Equal Access to Services     [facility] : Hadii aad ku hadasho Soomaali, waaxda luqadaha, qaybta kaalmada adeegyada, waxay idiin hayaan ginny ambrose . So [facility] [phone].    ATENCIÓN: Si habla español, tiene a rothman disposición servicios gratuitos de asistencia lingüística. Llame al [phone].    We comply with applicable federal civil rights laws and [place] laws. We do not discriminate on the basis of race, color, national origin, age, disability, sex, sexual orientation, or gender identity.            Thank you!     Thank you for choosing EYE CLINIC  for your care. Our goal is always to provide you with excellent care. Hearing back from our patients is one way we can continue to improve our services. Please take a few minutes to complete the written survey that you may receive in the mail after your visit with us. Thank you!             Your Updated Medication List - Protect others around you: Learn how to safely use,  store and throw away your medicines at www.disposemymeds.org.          This list is accurate as of: 1/5/18  4:26 PM.  Always use your most recent med list.                   Brand Name Dispense Instructions for use Diagnosis    folic acid 1 MG tablet    FOLVITE     Take 1 mg by mouth        methotrexate 2.5 MG tablet CHEMO      3 tablets by mouth once a week. Increase by 2 tablets every week until at 10 tablets once a week.        * predniSONE 20 MG tablet    DELTASONE    180 tablet    Take 3 tablets (60 mg) by mouth daily    Intermediate uveitis, bilateral, Cystoid macular degeneration of retina, left       * predniSONE 5 MG tablet    DELTASONE    60 tablet    Take 2 tablets (10 mg) by mouth daily    Intermediate uveitis, bilateral, Cystoid macular degeneration of retina, left       * Notice:  This list has 2 medication(s) that are the same as other medications prescribed for you. Read the directions carefully, and ask your doctor or other care provider to review them with you.

## 2018-01-05 NOTE — LETTER
1/5/2018      RE: Suma Schulz  [address] [facility] [address]       CC: uveitis follow up    HPI: Suma Schulz is a 32 year old  female here for uveitis follow up. Now on oral prednisone 10 mg/day with slow tapering. She reports no changes with eyes since last eye clinic visit. Her eye pain has resolved and vision left eye have improved since starting prednisone.    Ocular medications: prednisone 10 mg/day (tapering, started at 60 mg/day 9.16.17), oral methotrexate 25 mg/wk (started 10.16.17, increased to 25 mg/wk 11.13.17) + folic acid. IMT managed by Dr. Tapia ([facility] [place]).    Ocular history:   1. 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 In July 2017 she developed left eye ache. Although this symptom was different than her symptoms with IIH, she was concerned that the IIH was returning. She was initially evaluated by optometry and then referred to Dr. Chris Aparicio, who referred her to [facility] for further evaluation of schisis. She saw Dr. Land 8.31.17 and was found to have retinal vasculitis.   2. Retinoschisis cavities left eye. S/p laser barricade left eye 8.31.17 (Shanda).  3. IIH diagnosed in 2015 but sympoms started in 2012, treated with 4 months diamox (had side effects: joint swelling and pain). Symptoms resolved near the end of pregnancy.  4. History of red left eye about 10 years ago, treated with a short course of topical corticosteroids, patient unsure of diagnosis.     Medical history:  1. 2 uncomplicated pregnancies (vaginal delivery), although blood pressure was slightly elevated at the end of more recent pregnancy.  2. Not currently pregnant or breastfeeding.  3. Mirena IUD - not planning pregnancy soon.  4. Negative for diabetes mellitus.  5. Scalp cyst.    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     Laboratory/Imaging Results:  MRI brain/orbits 9.14.17: unremarkable.  Labs 8.31.17:  Quantiferon, RPR, Treponema pallidum ab, Lyme, HIV, Toxoplasma IgM/IgG, Bartonella henselae IgM/IgG, anti-Anaplsma phagocytophilum (HGA) IgM/IgG, urinary beta-2 microglobulin, anti-MPO, anti-pro3, ACE, ESR, CRP, and HLA-B27 all normal/negative. HSV1 IgG positive, HSV2 IgG negative. Complete blood count (CBC) with diff normal. Complete metabolic panel (CMP) unremarkable except for slightly elevated glucose (116 but non-fasting).    Ocular Imagin18 mac OCT:  Right eye: within normal limits, stable vs 17  Left eye: mild epiretinal membrane, 2 small intraretinal cysts, questionable subretinal fluid, stable vs 17    Optos fluorescein angiography 17 shows marked improvement of leakage left eye compared to 17.    Impression/Plan:  1. 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 Retinal vasculitis and cystoid macular edema improved today on 10 mg/day prednisone and 25 mg/wk methotrexate (started MTX nearly 3 months ago, at max dose for nearly 2 months).   - Continue methotrexate 25 mg/wk with Dr. Tapia; patient is tolerating well. If her inflammation does not respond to MTX, then would recommend CellCept (goal dose 1000 - 1500 mg twice a day) or Humira.   - Prednisone 7.5 mg/day for 3 weeks, then 5 mg/day for 3 weeks, then 2.5 mg/day for 3 weeks, then stop.   - Supplement with calcium/vitamin D   - May consider periocular Kenalog as well, if needed for inflammatory control, mitchell if patient is intolerant of oral prednisone (though she is tolerating it well at present)    2. 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 Does not extend posterior to laser.   - Continue to monitor     3. Cystoid macular edema left eye, likely secondary to #1.   - Improvement with oral prednisone, as described above    Return to uveitis clinic in 6 weeks: V/T/D/mac OCT/Optos fluorescein angiography transit left eye      Attending Physician Attestation:  Complete  documentation of historical and exam elements from today's encounter can be found in the full encounter summary report (not reduplicated in this progress note).  I personally obtained the chief complaint(s) and history of present illness.  I confirmed and edited as necessary the review of systems, past medical/surgical history, family history, social history, and examination findings as documented by others; and I examined the patient myself.  I personally reviewed the relevant tests, images, and reports as documented above.  I formulated and edited as necessary the assessment and plan and discussed the findings and management plan with the patient and family.  - Shannon Dominguez M.D.          Shannon Dominguez MD

## 2018-01-05 NOTE — PROGRESS NOTES
CC: uveitis follow up    HPI: Suma Schulz is a 32 year old  female here for uveitis follow up. Now on oral prednisone 10 mg/day with slow tapering. She reports no changes with eyes since last eye clinic visit. Her eye pain has resolved and vision left eye have improved since starting prednisone.    Ocular medications: prednisone 10 mg/day (tapering, started at 60 mg/day 9.16.17), oral methotrexate 25 mg/wk (started 10.16.17, increased to 25 mg/wk 11.13.17) + folic acid. IMT managed by Dr. Tapia ([facility]).    Ocular history:   1. 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 In July 2017 she developed left eye ache. Although this symptom was different than her symptoms with IIH, she was concerned that the IIH was returning. She was initially evaluated by optometry and then referred to Dr. Chris Aparicio, who referred her to [facility] for further evaluation of schisis. She saw Dr. Land 8.31.17 and was found to have retinal vasculitis.   2. Retinoschisis cavities left eye. S/p laser barricade left eye 8.31.17 (Shanda).  3. IIH diagnosed in 2015 but sympoms started in 2012, treated with 4 months diamox (had side effects: joint swelling and pain). Symptoms resolved near the end of pregnancy.  4. History of red left eye about 10 years ago, treated with a short course of topical corticosteroids, patient unsure of diagnosis.     Medical history:  1. 2 uncomplicated pregnancies (vaginal delivery), although blood pressure was slightly elevated at the end of more recent pregnancy.  2. Not currently pregnant or breastfeeding.  3. Mirena IUD - not planning pregnancy soon.  4. Negative for diabetes mellitus.  5. Scalp cyst.    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     Laboratory/Imaging Results:  MRI brain/orbits 9.14.17: unremarkable.  Labs 8.31.17: Quantiferon, RPR, Treponema pallidum ab, Lyme, HIV, Toxoplasma IgM/IgG, Bartonella  henselae IgM/IgG, anti-Anaplsma phagocytophilum (HGA) IgM/IgG, urinary beta-2 microglobulin, anti-MPO, anti-pro3, ACE, ESR, CRP, and HLA-B27 all normal/negative. HSV1 IgG positive, HSV2 IgG negative. Complete blood count (CBC) with diff normal. Complete metabolic panel (CMP) unremarkable except for slightly elevated glucose (116 but non-fasting).    Ocular Imagin18 mac OCT:  Right eye: within normal limits, stable vs 17  Left eye: mild epiretinal membrane, 2 small intraretinal cysts, questionable subretinal fluid, stable vs 17    Optos fluorescein angiography 17 shows marked improvement of leakage left eye compared to 17.    Impression/Plan:  1. 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 Retinal vasculitis and cystoid macular edema improved today on 10 mg/day prednisone and 25 mg/wk methotrexate (started MTX nearly 3 months ago, at max dose for nearly 2 months).   - Continue methotrexate 25 mg/wk with Dr. Tapia; patient is tolerating well. If her inflammation does not respond to MTX, then would recommend CellCept (goal dose 1000 - 1500 mg twice a day) or Humira.   - Prednisone 7.5 mg/day for 3 weeks, then 5 mg/day for 3 weeks, then 2.5 mg/day for 3 weeks, then stop.   - Supplement with calcium/vitamin D   - May consider periocular Kenalog as well, if needed for inflammatory control, mitchell if patient is intolerant of oral prednisone (though she is tolerating it well at present)    2. 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 Does not extend posterior to laser.   - Continue to monitor     3. Cystoid macular edema left eye, likely secondary to #1.   - Improvement with oral prednisone, as described above    Return to uveitis clinic in 6 weeks: V/T/D/mac OCT/Optos fluorescein angiography transit left eye      Attending Physician Attestation:  Complete documentation of historical and exam elements from today's encounter can be found in the full  encounter summary report (not reduplicated in this progress note).  I personally obtained the chief complaint(s) and history of present illness.  I confirmed and edited as necessary the review of systems, past medical/surgical history, family history, social history, and examination findings as documented by others; and I examined the patient myself.  I personally reviewed the relevant tests, images, and reports as documented above.  I formulated and edited as necessary the assessment and plan and discussed the findings and management plan with the patient and family.  - Shannon Dominguez M.D.

## 2018-01-05 NOTE — PATIENT INSTRUCTIONS
Oral prednisone: 7.5 mg/day for 3 weeks then 5 mg/day for 3 weeks then 2.5 mg/day for 3 weeks then stop  Continue methotrexate 25 mg/wk

## 2018-01-05 NOTE — LETTER
January 5, 2018      Eldon Tapia MD  [facility]  [address] [place] [address]  Fax: [phone]      RE: Suma Schulz  [address] [facility] [address]       Dear Colleagues:     Thank you for referring your patient, Suma Schulz, to the EYE CLINIC at [facility]. Please see a copy of my visit note below.    CC: Uveitis follow up    HPI: Suma Schulz is a 32 year old  female here for uveitis follow up. Now on oral prednisone 10 mg/day with slow tapering. She reports no changes with eyes since last eye clinic visit. Her eye pain has resolved and vision left eye have improved since starting prednisone.    Ocular medications: prednisone 10 mg/day (tapering, started at 60 mg/day 9.16.17), oral methotrexate 25 mg/wk (started 10.16.17, increased to 25 mg/wk 11.13.17) + folic acid. IMT managed by Dr. Tapia ([facility] [place]).    Ocular history:   1. 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 In July 2017 she developed left eye ache. Although this symptom was different than her symptoms with IIH, she was concerned that the IIH was returning. She was initially evaluated by optometry and then referred to Dr. Chris Aparicio, who referred her to [facility] for further evaluation of schisis. She saw Dr. Land 8.31.17 and was found to have retinal vasculitis.   2. Retinoschisis cavities left eye. S/p laser barricade left eye 8.31.17 (Shanda).  3. IIH diagnosed in 2015 but sympoms started in 2012, treated with 4 months diamox (had side effects: joint swelling and pain). Symptoms resolved near the end of pregnancy.  4. History of red left eye about 10 years ago, treated with a short course of topical corticosteroids, patient unsure of diagnosis.     Medical history:  1. 2 uncomplicated pregnancies (vaginal delivery), although blood pressure was slightly elevated at the end of more recent pregnancy.  2. Not currently pregnant or  breastfeeding.  3. Mirena IUD - not planning pregnancy soon.  4. Negative for diabetes mellitus.  5. Scalp cyst.    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     Laboratory/Imaging Results:  MRI brain/orbits 17: unremarkable.  Labs 17: Quantiferon, RPR, Treponema pallidum ab, Lyme, HIV, Toxoplasma IgM/IgG, Bartonella henselae IgM/IgG, anti-Anaplsma phagocytophilum (HGA) IgM/IgG, urinary beta-2 microglobulin, anti-MPO, anti-pro3, ACE, ESR, CRP, and HLA-B27 all normal/negative. HSV1 IgG positive, HSV2 IgG negative. Complete blood count (CBC) with diff normal. Complete metabolic panel (CMP) unremarkable except for slightly elevated glucose (116 but non-fasting).    Ocular Imagin18 mac OCT:  Right eye: within normal limits, stable vs 17  Left eye: mild epiretinal membrane, 2 small intraretinal cysts, questionable subretinal fluid, stable vs 17    Optos fluorescein angiography 17 shows marked improvement of leakage left eye compared to 17.    Impression/Plan:  1. 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 Retinal vasculitis and cystoid macular edema improved today on 10 mg/day prednisone and 25 mg/wk methotrexate (started MTX nearly 3 months ago, at max dose for nearly 2 months).   - Continue methotrexate 25 mg/wk with Dr. Tapia; patient is tolerating well. If her inflammation                does not respond to MTX, then would recommend CellCept (goal dose 1000 - 1500 mg twice a                    day) or Humira.   - Prednisone 7.5 mg/day for 3 weeks, then 5 mg/day for 3 weeks, then 2.5 mg/day for 3 weeks,                      then stop.   - Supplement with calcium/vitamin D   - May consider periocular Kenalog as well, if needed for inflammatory control, mitchell if patient is                         intolerant of oral prednisone (though she is tolerating it well at present)    2. Bullous  retinoschisis left eye. S/p barricade laser. Does not extend posterior to laser.   - Continue to monitor     3. Cystoid macular edema left eye, likely secondary to #1.   - Improvement with oral prednisone, as described above            Return to uveitis clinic in 6 weeks: V/T/D/mac OCT/Optos fluorescein angiography transit left eye.      Again, thank you for allowing me to participate in the care of your patient.        Sincerely,      Shannon Dominguez MD    Cc:   MD Marta Varner MD Andrew Jordan, MD

## 2018-01-05 NOTE — NURSING NOTE
Chief Complaints and History of Present Illnesses   Patient presents with     Follow Up For     Intermediate uveitis, bilateral (Primary Dx);     HPI    Affected eye(s):  Both   Symptoms:     No blurred vision   No decreased vision   No floaters   No flashes      Duration:  6 weeks   Frequency:  Constant       Do you have eye pain now?:  No      Comments:  States va is the same since last visit. Blurry vision LE along with longer duration to adjust to changing light conditions.    Ivan Chowdhury  1:47 PM January 5, 2018

## 2018-02-23 ENCOUNTER — OFFICE VISIT (OUTPATIENT)
Dept: OPHTHALMOLOGY | Facility: CLINIC | Age: 33
End: 2018-02-23
Attending: OPHTHALMOLOGY
Payer: COMMERCIAL

## 2018-02-23 DIAGNOSIS — H35.352 CYSTOID MACULAR DEGENERATION OF RETINA, LEFT: ICD-10-CM

## 2018-02-23 DIAGNOSIS — H30.23 INTERMEDIATE UVEITIS, BILATERAL: Primary | ICD-10-CM

## 2018-02-23 PROCEDURE — 92235 FLUORESCEIN ANGRPH MLTIFRAME: CPT | Mod: ZF | Performed by: OPHTHALMOLOGY

## 2018-02-23 PROCEDURE — 92134 CPTRZ OPH DX IMG PST SGM RTA: CPT | Mod: ZF | Performed by: OPHTHALMOLOGY

## 2018-02-23 PROCEDURE — G0463 HOSPITAL OUTPT CLINIC VISIT: HCPCS | Mod: ZF | Performed by: TECHNICIAN/TECHNOLOGIST

## 2018-02-23 RX ORDER — PREDNISONE 10 MG/1
10 TABLET ORAL DAILY
Qty: 60 TABLET | Refills: 1 | Status: SHIPPED | OUTPATIENT
Start: 2018-02-23 | End: 2018-10-05

## 2018-02-23 ASSESSMENT — CUP TO DISC RATIO
OS_RATIO: 0.1
OD_RATIO: 0.1

## 2018-02-23 ASSESSMENT — REFRACTION_WEARINGRX
OS_CYLINDER: +1.50
OS_SPHERE: -1.50
SPECS_TYPE: SVL
OD_CYLINDER: +0.50
OD_AXIS: 087
OD_SPHERE: -0.50
OS_AXIS: 095

## 2018-02-23 ASSESSMENT — EXTERNAL EXAM - LEFT EYE: OS_EXAM: NORMAL

## 2018-02-23 ASSESSMENT — CONF VISUAL FIELD
OS_NORMAL: 1
OD_NORMAL: 1
METHOD: COUNTING FINGERS

## 2018-02-23 ASSESSMENT — SLIT LAMP EXAM - LIDS
COMMENTS: NORMAL
COMMENTS: NORMAL

## 2018-02-23 ASSESSMENT — TONOMETRY
IOP_METHOD: ICARE
OS_IOP_MMHG: 16
OD_IOP_MMHG: 16

## 2018-02-23 ASSESSMENT — EXTERNAL EXAM - RIGHT EYE: OD_EXAM: NORMAL

## 2018-02-23 ASSESSMENT — VISUAL ACUITY
OS_CC: 20/40
METHOD: SNELLEN - LINEAR
CORRECTION_TYPE: GLASSES
OD_CC: 20/20

## 2018-02-23 NOTE — LETTER
February 23, 2018      Eldon Tapia MD  [facility]  [address] [place] [address]  Fax: [phone]      RE: Suma Schulz  [address] [facility] [address]       Dear Dr. Tapia:    I saw our mutual patient, Suma Schulz, in the EYE CLINIC at [facility]. Her retinal vasculitis has flared during the prednisone taper, so methotrexate is only partially effective. I recommend adding Humira. Please see a copy of my visit note below.    CC: Uveitis follow up    HPI: Suma Schulz is a 33 year old  female here for uveitis follow up. Now on oral prednisone 2.5 mg/day with slow tapering (at this dose for about 1 week). She reports worsened vision left eye, more obvious floater left eye, new intermittent eye pain left eye, and no change right eye since last visit.    Ocular medications: prednisone 2.5 mg/day (tapering, started at 60 mg/day 9.16.17), oral methotrexate 25 mg/wk (started 10.16.17, increased to 25 mg/wk 11.13.17) + folic acid. IMT managed by Dr. Tapia ([facility], [place]).    Ocular history:   1. 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 In July 2017 she developed left eye ache. Although this symptom was different than her symptoms with IIH, she was concerned that the IIH was returning. She was initially evaluated by optometry and then referred to Dr. Chris Aparicio, who referred her to [facility] for further evaluation of schisis. She saw Dr. Land 8.31.17 and was found to have retinal vasculitis.   2. Retinoschisis cavities left eye. S/p laser barricade left eye 8.31.17 (Shanda).  3. IIH diagnosed in 2015 but symptoms started in 2012, treated with 4 months diamox (had side effects: joint swelling and pain). Symptoms resolved near the end of pregnancy.  4. History of red left eye about 10 years ago, treated with a short course of topical corticosteroids, patient unsure of diagnosis.     Medical history:  1. 2  uncomplicated pregnancies (vaginal delivery), although blood pressure was slightly elevated at the end of more recent pregnancy.  2. Not currently pregnant or breastfeeding.  3. Mirena IUD - not planning pregnancy soon.  4. Negative for diabetes mellitus.  5. Scalp cyst.    Social history: [employer]  . Never smoked. Alcohol use: ~one beverage monthly.     Laboratory/Imaging Results:  MRI brain/orbits 17: unremarkable.  Labs 17: Quantiferon, RPR, Treponema pallidum ab, Lyme, HIV, Toxoplasma IgM/IgG, Bartonella henselae IgM/IgG, anti-Anaplsma phagocytophilum (HGA) IgM/IgG, urinary beta-2 microglobulin, anti-MPO, anti-pro3, ACE, ESR, CRP, and HLA-B27 all normal/negative. HSV1 IgG positive, HSV2 IgG negative. Complete blood count (CBC) with diff normal. Complete metabolic panel (CMP) unremarkable except for slightly elevated glucose (116 but non-fasting).    Ocular Imagin18 mac OCT:  Right eye: within normal limits, stable vs 18 and 17  Left eye: mild epiretinal membrane, cystoid macular edema new from 18    Optos fluorescein angiography 18 shows worse leakage left eye vs 17, which was improved compared to 17.    Impression/Plan:  1. 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 Recurrence of retinal vasculitis and cystoid macular edema left eye on 2.5 mg/day prednisone and 25 mg/wk methotrexate (started MTX ~4 months ago, at max dose for ~3 months). Although Ms. Schulz is tolerating methotrexate, her ocular disease is only partially controlled.   - Continue methotrexate with Dr. Tapia   - Recommend adding Humira q2wk   - Increase prednisone to 10 mg/day   - Supplement with calcium/vitamin D   - May consider periocular Kenalog as well, if needed for inflammatory control, mitchell if patient is                 intolerant of oral prednisone (though she is tolerating it well at present)    2. Bullous retinoschisis left eye.  S/p barricade laser. Does not extend posterior to laser.   - Continue to monitor     3. Cystoid macular edema left eye, likely secondary to #1.   - Has recurred with oral prednisone taper   - See management of #1          Return to uveitis clinic in 6 weeks: V/T/D/mac OCT.    Again, thank you for allowing me to participate in the care of your patient.  Please feel free to contact me with any questions or concerns; my cell phone number is [phone].        Sincerely,      Shannon Dominguez MD    Cc: MD Marta Varner MD Andrew Jordan, MD

## 2018-02-23 NOTE — NURSING NOTE
Chief Complaints and History of Present Illnesses   Patient presents with     Follow Up For     uveitis     HPI    Symptoms:              Comments:  Uveitis follow up.     No new changes in vision per patient.   Left eye has intermittent pain.  MORE Ellis 2/23/2018 12:26 PM

## 2018-02-23 NOTE — MR AVS SNAPSHOT
After Visit Summary   2/23/2018    Suma Schulz    MRN: [id number]           Patient Information     Date Of Birth          1985        Visit Information        Provider Department      2/23/2018 12:30 PM Shannon Dominguez MD [facility]        Today's Diagnoses     Intermediate uveitis, bilateral    -  1    Cystoid macular degeneration of retina, left          Care Instructions    Increase prednisone to 10 mg/day  Continue methotrexate          Follow-ups after your visit        Follow-up notes from your care team     Return in about 3 months (around 5/23/2018).      Your next 10 appointments already scheduled     Apr 06, 2018 12:00 PM CDT   RETURN UVEITIS with Shannon Dominguez MD   Eye Clinic ([facility])    [address]  [facility] [address]   [phone]            May 25, 2018 12:00 PM CDT   RETURN UVEITIS with Shannon Dominguez MD   Eye Clinic ([facility])    [address]  [facility] [address]   [phone]              Who to contact     Please call your clinic at [phone] to:    Ask questions about your health    Make or cancel appointments    Discuss your medicines    Learn about your test results    Speak to your doctor            Additional Information About Your Visit        MyChart Information     Compufirstt is an electronic gateway that provides easy, online access to your medical records. With [facility], you can request a clinic appointment, read your test results, renew a prescription or communicate with your care team.     To sign up for Compufirstt visit the website at [email or web address]   You will be asked to enter the access code listed below, as well as some personal information. Please follow the directions to create your username and password.     Your access code is: [email or web address]  Expires: 3/22/2018  6:31 AM     Your access code  will  in 90 days. If you need help or a new code, please contact your [facility] or call [phone] for assistance.        Care EveryWhere ID     This is your Care EveryWhere ID. This could be used by other organizations to access your [facility] medical records  [id number]         Blood Pressure from Last 3 Encounters:   No data found for BP    Weight from Last 3 Encounters:   No data found for Wt              We Performed the Following     Fluorescein Angiography OU (both eyes)     OCT Retina Spectralis OU (both eyes)          Today's Medication Changes          These changes are accurate as of 18  2:37 PM.  If you have any questions, ask your nurse or doctor.               These medicines have changed or have updated prescriptions.        Dose/Directions    predniSONE 10 MG tablet   Commonly known as:  DELTASONE   This may have changed:    - medication strength  - how much to take   Used for:  Intermediate uveitis, bilateral, Cystoid macular degeneration of retina, left   Changed by:  Shannon Dominguez MD        Dose:  10 mg   Take 1 tablet (10 mg) by mouth daily   Quantity:  60 tablet   Refills:  1            Where to get your medicines      These medications were sent to [facility]/pharmacy #[id number] - [place], [place] - [address] [address] [facility] [address]     Phone:  [phone]     predniSONE 10 MG tablet                Primary Care Provider Office Phone # Fax #    Leilani COATS Juan Carlos [phone]       [facility] [address]  [facility] [address]        Equal Access to Services     GAEL PAGE AH: Hadii aad ku hadasho Soomaali, waaxda luqadaha, qaybta kaalmada adeegyada, efra navarro. So [facility] [phone].    ATENCIÓN: Si habla español, tiene a rothman disposición servicios gratuitos de asistencia lingüística. Llame al [phone].    We comply with applicable federal civil rights laws and [place] laws.  We do not discriminate on the basis of race, color, national origin, age, disability, sex, sexual orientation, or gender identity.            Thank you!     Thank you for choosing EYE CLINIC  for your care. Our goal is always to provide you with excellent care. Hearing back from our patients is one way we can continue to improve our services. Please take a few minutes to complete the written survey that you may receive in the mail after your visit with us. Thank you!             Your Updated Medication List - Protect others around you: Learn how to safely use, store and throw away your medicines at www.disposemymeds.org.          This list is accurate as of 2/23/18  2:37 PM.  Always use your most recent med list.                   Brand Name Dispense Instructions for use Diagnosis    calcium citrate-vitamin D 315-200 MG-UNIT Tabs per tablet    CITRACAL     1 tablet        folic acid 1 MG tablet    FOLVITE     Take 1 mg by mouth        levonorgestrel 20 MCG/24HR IUD    MIRENA     20 mcg        methotrexate 2.5 MG tablet CHEMO      3 tablets by mouth once a week. Increase by 2 tablets every week until at 10 tablets once a week.        predniSONE 10 MG tablet    DELTASONE    60 tablet    Take 1 tablet (10 mg) by mouth daily    Intermediate uveitis, bilateral, Cystoid macular degeneration of retina, left

## 2018-02-23 NOTE — PROGRESS NOTES
CC: uveitis follow up    HPI: Suma Schulz is a 33 year old  female here for uveitis follow up. Now on oral prednisone 2.5 mg/day with slow tapering (at this dose for about 1 week). She reports worsened vision left eye, more obvious floater left eye, new intermittent eye pain left eye, and no change right eye since last visit.    Ocular medications: prednisone 2.5 mg/day (tapering, started at 60 mg/day 9.16.17), oral methotrexate 25 mg/wk (started 10.16.17, increased to 25 mg/wk 11.13.17) + folic acid. IMT managed by Dr. Tapia ([facility]).    Ocular history:   1. 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 In July 2017 she developed left eye ache. Although this symptom was different than her symptoms with IIH, she was concerned that the IIH was returning. She was initially evaluated by optometry and then referred to Dr. Chris Aparicio, who referred her to [facility] for further evaluation of schisis. She saw Dr. Land 8.31.17 and was found to have retinal vasculitis.   2. Retinoschisis cavities left eye. S/p laser barricade left eye 8.31.17 (Shanda).  3. IIH diagnosed in 2015 but sympoms started in 2012, treated with 4 months diamox (had side effects: joint swelling and pain). Symptoms resolved near the end of pregnancy.  4. History of red left eye about 10 years ago, treated with a short course of topical corticosteroids, patient unsure of diagnosis.     Medical history:  1. 2 uncomplicated pregnancies (vaginal delivery), although blood pressure was slightly elevated at the end of more recent pregnancy.  2. Not currently pregnant or breastfeeding.  3. Mirena IUD - not planning pregnancy soon.  4. Negative for diabetes mellitus.  5. Scalp cyst.    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     Laboratory/Imaging Results:  MRI brain/orbits 9.14.17: unremarkable.  Labs 8.31.17: Quantiferon, RPR, Treponema pallidum ab, Lyme,  HIV, Toxoplasma IgM/IgG, Bartonella henselae IgM/IgG, anti-Anaplsma phagocytophilum (HGA) IgM/IgG, urinary beta-2 microglobulin, anti-MPO, anti-pro3, ACE, ESR, CRP, and HLA-B27 all normal/negative. HSV1 IgG positive, HSV2 IgG negative. Complete blood count (CBC) with diff normal. Complete metabolic panel (CMP) unremarkable except for slightly elevated glucose (116 but non-fasting).    Ocular Imagin18 mac OCT:  Right eye: within normal limits, stable vs 18 and 17  Left eye: mild epiretinal membrane, cystoid macular edema new from 18    Optos fluorescein angiography 18 shows worse leakage left eye vs 17, which was improved compared to 17.    Impression/Plan:  1. 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 Recurrence of retinal vasculitis and cystoid macular edema left eye on 2.5 mg/day prednisone and 25 mg/wk methotrexate (started MTX ~4 months ago, at max dose for ~3 months). Although Ms. Schulz is tolerating methotrexate, her ocular disease is only partially controlled.   - Continue methotrexate with Dr. Tapia   - Recommend adding Humira q2wk   - Increase prednisone to 10 mg/day   - Supplement with calcium/vitamin D   - May consider periocular Kenalog as well, if needed for inflammatory control, mitchell if patient is intolerant of oral prednisone (though she is tolerating it well at present)    2. 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 Does not extend posterior to laser.   - Continue to monitor     3. Cystoid macular edema left eye, likely secondary to #1.   - Has recurred with oral prednisone taper   - See management of #1    Return to uveitis clinic in 6 weeks: V/T/D/mac OCT      Attending Physician Attestation:  Complete documentation of historical and exam elements from today's encounter can be found in the full encounter summary report (not reduplicated in this progress note).  I personally obtained the chief complaint(s)  and history of present illness.  I confirmed and edited as necessary the review of systems, past medical/surgical history, family history, social history, and examination findings as documented by others; and I examined the patient myself.  I personally reviewed the relevant tests, images, and reports as documented above.  I formulated and edited as necessary the assessment and plan and discussed the findings and management plan with the patient and family.  - Shannon Dominguez M.D.

## 2018-04-06 ENCOUNTER — OFFICE VISIT (OUTPATIENT)
Dept: OPHTHALMOLOGY | Facility: CLINIC | Age: 33
End: 2018-04-06
Attending: OPHTHALMOLOGY
Payer: COMMERCIAL

## 2018-04-06 DIAGNOSIS — H30.23 INTERMEDIATE UVEITIS OF BOTH EYES: Primary | ICD-10-CM

## 2018-04-06 PROCEDURE — G0463 HOSPITAL OUTPT CLINIC VISIT: HCPCS | Mod: ZF

## 2018-04-06 PROCEDURE — 92134 CPTRZ OPH DX IMG PST SGM RTA: CPT | Mod: ZF | Performed by: OPHTHALMOLOGY

## 2018-04-06 ASSESSMENT — EXTERNAL EXAM - RIGHT EYE: OD_EXAM: NORMAL

## 2018-04-06 ASSESSMENT — VISUAL ACUITY
CORRECTION_TYPE: GLASSES
METHOD: SNELLEN - LINEAR
OS_CC: 20/25
OD_CC: J1+
OS_CC: J2
OD_CC: 20/20

## 2018-04-06 ASSESSMENT — CUP TO DISC RATIO
OD_RATIO: 0.1
OS_RATIO: 0.1

## 2018-04-06 ASSESSMENT — TONOMETRY
IOP_METHOD: APPLANATION
OD_IOP_MMHG: 13
OS_IOP_MMHG: 12

## 2018-04-06 ASSESSMENT — CONF VISUAL FIELD
OD_NORMAL: 1
METHOD: COUNTING FINGERS
OS_NORMAL: 1

## 2018-04-06 ASSESSMENT — EXTERNAL EXAM - LEFT EYE: OS_EXAM: NORMAL

## 2018-04-06 ASSESSMENT — SLIT LAMP EXAM - LIDS
COMMENTS: NORMAL
COMMENTS: NORMAL

## 2018-04-06 NOTE — PATIENT INSTRUCTIONS
Start Humira  After third Humira dose, then decrease oral prednisone to 7.5 mg/day for 2 weeks then 5 mg/day for 2 weeks then 2.5 mg/day  Continue methotrexate

## 2018-04-06 NOTE — LETTER
April 6, 2018      Eldon Tapia MD  [facility]  [address] [place] [address]  Fax: [phone]       RE: Suma Schulz  [address] [place] [address]       Dear Colleague,    Thank you for referring your patient, Suma Schulz, to the EYE CLINIC at [facility]. Please see a copy of my visit note below.    CC: Uveitis follow up    HPI: Suma Schulz is a 33 year old  female here for uveitis follow up. Oral prednisone was increased from 2.5 to 10 mg/day last visit for flare despite MTX. She just picked up Humira yesterday and will start tomorrow. Less eye pain left eye, no worsening of vision since last visit.    Ocular medications: prednisone 10 mg/day (started at 60 mg/day 9.16.17), Humira q2wk (starting 4.7.18), oral methotrexate 25 mg/wk (started 10.16.17, increased to 25 mg/wk 11.13.17) + folic acid. IMT managed by Dr. Tapia ([facility], [place]).  Note: Flared at 25 mg/wk MTX (at max ocular dose 3 months) and 2.5 mg/day oral prednisone.    Ocular history:   1. 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 In July 2017 she developed left eye ache. Although this symptom was different than her symptoms with IIH, she was concerned that the IIH was returning. She was initially evaluated by optometry and then referred to Dr. Chris Aparicio, who referred her to [facility] for further evaluation of schisis. She saw Dr. Land 8.31.17 and was found to have retinal vasculitis.   2. Retinoschisis cavities left eye. S/p laser barricade left eye 8.31.17 (Shanda).  3. IIH diagnosed in 2015 but symptoms started in 2012, treated with 4 months diamox (had side effects: joint swelling and pain). Symptoms resolved near the end of pregnancy.  4. History of red left eye about 10 years ago, treated with a short course of topical corticosteroids, patient unsure of diagnosis.     Medical history:  1. 2 uncomplicated pregnancies (vaginal delivery),  although blood pressure was slightly elevated at the end of more recent pregnancy.  2. Not currently pregnant or breastfeeding.  3. Mirena IUD - not planning pregnancy soon.  4. Negative for diabetes mellitus.  5. Scalp cyst.    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     Laboratory/Imaging Results:  MRI brain/orbits 17: unremarkable.  Labs 17: Quantiferon, RPR, Treponema pallidum ab, Lyme, HIV, Toxoplasma IgM/IgG, Bartonella henselae IgM/IgG, anti-Anaplsma phagocytophilum (HGA) IgM/IgG, urinary beta-2 microglobulin, anti-MPO, anti-pro3, ACE, ESR, CRP, and HLA-B27 all normal/negative. HSV1 IgG positive, HSV2 IgG negative. Complete blood count (CBC) with diff normal. Complete metabolic panel (CMP) unremarkable except for slightly elevated glucose (116 but non-fasting).    Ocular Imagin18 mac OCT:  Right eye: within normal limits, stable vs 18, .18 and 17  Left eye: mild epiretinal membrane, rare small intraretinal cysts with resolution of the cystoid macular edema that was present 18, retinal thickening also improved    Optos fluorescein angiography 18 shows worse leakage left eye vs 17, which was improved compared to 17.    Impression/Plan:  1. 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 Recent recurrence of retinal vasculitis and cystoid macular edema left eye on 2.5 mg/day prednisone and 25 mg/wk methotrexate. Quiet today on 10 mg/day oral prednisone and 25 mg/wk methotrexate, will start Humira tomorrow.   - Continue methotrexate with Dr. Tapia   - Start Humira q2wk tomorrow as planned   - Continue prednisone at 10 mg/day, then decrease to 7.5 mg/day following 3rd Humira dose. Stay                 at 7.5 mg/day for 2 weeks, then reduce to 5 mg/day for 2 weeks, then 2.5 mg/day   - Supplement with calcium/vitamin D   - May consider periocular Kenalog as well, if needed for  inflammatory control, mitchell if patient is                 intolerant of oral prednisone (though she is tolerating it well at present)    2. 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 Does not extend posterior to laser.   - Continue to monitor     3. Cystoid macular edema left eye, likely secondary to #1.   - Has recurred with oral prednisone taper   - See management of #1    Return to uveitis clinic in 6-8 weeks: V/T/D/mac OCT            Again, thank you for allowing me to participate in the care of your patient.        Sincerely,      Shannon Dominguez MD    Cc: MD Chris Varner MD Sandra Montezuma, MD

## 2018-04-06 NOTE — LETTER
4/6/2018      RE: Suma Schulz  [address] [facility] [address]       CC: uveitis follow up    HPI: Suma Schulz is a 33 year old  female here for uveitis follow up. Oral prednisone was increased from 2.5 to 10 mg/day last visit for flare despite MTX. She just picked up Humira yesterday and will start tomorrow. Less eye pain left eye, no worsening of vision since last visit.    Ocular medications: prednisone 10 mg/day (started at 60 mg/day 9.16.17), Humira q2wk (starting 4.7.18), oral methotrexate 25 mg/wk (started 10.16.17, increased to 25 mg/wk 11.13.17) + folic acid. IMT managed by Dr. Tapia ([facility]).  Note: Flared at 25 mg/wk MTX (at max ocular dose 3 months) and 2.5 mg/day oral prednisone.    Ocular history:   1. 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 In July 2017 she developed left eye ache. Although this symptom was different than her symptoms with IIH, she was concerned that the IIH was returning. She was initially evaluated by optometry and then referred to Dr. Chris Aparicio, who referred her to [facility] for further evaluation of schisis. She saw Dr. Land 8.31.17 and was found to have retinal vasculitis.   2. Retinoschisis cavities left eye. S/p laser barricade left eye 8.31.17 (Shanda).  3. IIH diagnosed in 2015 but sympoms started in 2012, treated with 4 months diamox (had side effects: joint swelling and pain). Symptoms resolved near the end of pregnancy.  4. History of red left eye about 10 years ago, treated with a short course of topical corticosteroids, patient unsure of diagnosis.     Medical history:  1. 2 uncomplicated pregnancies (vaginal delivery), although blood pressure was slightly elevated at the end of more recent pregnancy.  2. Not currently pregnant or breastfeeding.  3. Mirena IUD - not planning pregnancy soon.  4. Negative for diabetes mellitus.  5. Scalp cyst.    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 Alcohol  use: ~one beverage monthly.     Laboratory/Imaging Results:  MRI brain/orbits 17: unremarkable.  Labs 17: Quantiferon, RPR, Treponema pallidum ab, Lyme, HIV, Toxoplasma IgM/IgG, Bartonella henselae IgM/IgG, anti-Anaplsma phagocytophilum (HGA) IgM/IgG, urinary beta-2 microglobulin, anti-MPO, anti-pro3, ACE, ESR, CRP, and HLA-B27 all normal/negative. HSV1 IgG positive, HSV2 IgG negative. Complete blood count (CBC) with diff normal. Complete metabolic panel (CMP) unremarkable except for slightly elevated glucose (116 but non-fasting).    Ocular Imagin18 mac OCT:  Right eye: within normal limits, stable vs 18, 18 and 17  Left eye: mild epiretinal membrane, rare small intraretinal cysts with resolution of the cystoid macular edema that was present 18, retinal thickening also improved    Optos fluorescein angiography 18 shows worse leakage left eye vs 17, which was improved compared to 17.    Impression/Plan:  1. 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 Recent recurrence of retinal vasculitis and cystoid macular edema left eye on 2.5 mg/day prednisone and 25 mg/wk methotrexate. Quiet today on 10 mg/day oral prednisone and 25 mg/wk methotrexate, will start Humira tomorrow.   - Continue methotrexate with Dr. Tapia   - Start Humira q2wk tomorrow as planned   -Continue prednisone at 10 mg/day, then decrease to 7.5 mg/day following 3rd Humira dose. Stay at 7.5 mg/day for 2 weeks, then reduce to 5 mg/day for 2 weeks, then 2.5 mg/day   - Supplement with calcium/vitamin D   - May consider periocular Kenalog as well, if needed for inflammatory control, mitchell if patient is intolerant of oral prednisone (though she is tolerating it well at present)    2. 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 Does not extend posterior to laser.   - Continue to monitor     3. Cystoid macular edema left eye, likely secondary to #1.   - Has  recurred with oral prednisone taper   - See management of #1    Return to uveitis clinic in 6-8 weeks: V/T/D/mac OCT      Attending Physician Attestation:  Complete documentation of historical and exam elements from today's encounter can be found in the full encounter summary report (not reduplicated in this progress note).  I personally obtained the chief complaint(s) and history of present illness.  I confirmed and edited as necessary the review of systems, past medical/surgical history, family history, social history, and examination findings as documented by others; and I examined the patient myself.  I personally reviewed the relevant tests, images, and reports as documented above.  I formulated and edited as necessary the assessment and plan and discussed the findings and management plan with the patient and family.  - Shannon Dominguez M.D.          Shannon Dominguez MD

## 2018-04-06 NOTE — PROGRESS NOTES
CC: uveitis follow up    HPI: Suma Schulz is a 33 year old  female here for uveitis follow up. Oral prednisone was increased from 2.5 to 10 mg/day last visit for flare despite MTX. She just picked up Humira yesterday and will start tomorrow. Less eye pain left eye, no worsening of vision since last visit.    Ocular medications: prednisone 10 mg/day (started at 60 mg/day 9.16.17), Humira q2wk (starting 4.7.18), oral methotrexate 25 mg/wk (started 10.16.17, increased to 25 mg/wk 11.13.17) + folic acid. IMT managed by Dr. Tapia ([facility] [place]).  Note: Flared at 25 mg/wk MTX (at max ocular dose 3 months) and 2.5 mg/day oral prednisone.    Ocular history:   1. 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 In July 2017 she developed left eye ache. Although this symptom was different than her symptoms with IIH, she was concerned that the IIH was returning. She was initially evaluated by optometry and then referred to Dr. Chris Aparicio, who referred her to [facility] for further evaluation of schisis. She saw Dr. Land 8.31.17 and was found to have retinal vasculitis.   2. Retinoschisis cavities left eye. S/p laser barricade left eye 8.31.17 (Shanda).  3. IIH diagnosed in 2015 but sympoms started in 2012, treated with 4 months diamox (had side effects: joint swelling and pain). Symptoms resolved near the end of pregnancy.  4. History of red left eye about 10 years ago, treated with a short course of topical corticosteroids, patient unsure of diagnosis.     Medical history:  1. 2 uncomplicated pregnancies (vaginal delivery), although blood pressure was slightly elevated at the end of more recent pregnancy.  2. Not currently pregnant or breastfeeding.  3. Mirena IUD - not planning pregnancy soon.  4. Negative for diabetes mellitus.  5. Scalp cyst.    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     Laboratory/Imaging Results:  MRI  brain/orbits 17: unremarkable.  Labs 17: Quantiferon, RPR, Treponema pallidum ab, Lyme, HIV, Toxoplasma IgM/IgG, Bartonella henselae IgM/IgG, anti-Anaplsma phagocytophilum (HGA) IgM/IgG, urinary beta-2 microglobulin, anti-MPO, anti-pro3, ACE, ESR, CRP, and HLA-B27 all normal/negative. HSV1 IgG positive, HSV2 IgG negative. Complete blood count (CBC) with diff normal. Complete metabolic panel (CMP) unremarkable except for slightly elevated glucose (116 but non-fasting).    Ocular Imagin18 mac OCT:  Right eye: within normal limits, stable vs 18, 18 and 17  Left eye: mild epiretinal membrane, rare small intraretinal cysts with resolution of the cystoid macular edema that was present 18, retinal thickening also improved    Optos fluorescein angiography 18 shows worse leakage left eye vs 17, which was improved compared to 17.    Impression/Plan:  1. 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 Recent recurrence of retinal vasculitis and cystoid macular edema left eye on 2.5 mg/day prednisone and 25 mg/wk methotrexate. Quiet today on 10 mg/day oral prednisone and 25 mg/wk methotrexate, will start Humira tomorrow.   - Continue methotrexate with Dr. Tapia   - Start Humira q2wk tomorrow as planned   -Continue prednisone at 10 mg/day, then decrease to 7.5 mg/day following 3rd Humira dose. Stay at 7.5 mg/day for 2 weeks, then reduce to 5 mg/day for 2 weeks, then 2.5 mg/day   - Supplement with calcium/vitamin D   - May consider periocular Kenalog as well, if needed for inflammatory control, mitchell if patient is intolerant of oral prednisone (though she is tolerating it well at present)    2. 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 Does not extend posterior to laser.   - Continue to monitor     3. Cystoid macular edema left eye, likely secondary to #1.   - Has recurred with oral prednisone taper   - See management of #1    Return  to uveitis clinic in 6-8 weeks: V/T/D/mac OCT      Attending Physician Attestation:  Complete documentation of historical and exam elements from today's encounter can be found in the full encounter summary report (not reduplicated in this progress note).  I personally obtained the chief complaint(s) and history of present illness.  I confirmed and edited as necessary the review of systems, past medical/surgical history, family history, social history, and examination findings as documented by others; and I examined the patient myself.  I personally reviewed the relevant tests, images, and reports as documented above.  I formulated and edited as necessary the assessment and plan and discussed the findings and management plan with the patient and family.  - Shannon Dominguez M.D.

## 2018-04-06 NOTE — MR AVS SNAPSHOT
After Visit Summary   2018    Suma Schulz    MRN: [id number]           Patient Information     Date Of Birth          1985        Visit Information        Provider Department      2018 12:00 PM Shannon Dominguez MD [facility]        Today's Diagnoses     Intermediate uveitis of both eyes    -  1      Care Instructions    Start Humira  After third Humira dose, then decrease oral prednisone to 7.5 mg/day for 2 weeks then 5 mg/day for 2 weeks then 2.5 mg/day  Continue methotrexate          Follow-ups after your visit        Follow-up notes from your care team     Return in about 2 months (around 2018).      Your next 10 appointments already scheduled     2018 12:45 PM CDT   RETURN UVEITIS with Shannon Dominguez MD   Eye Clinic ([facility])    [address] [address]   [phone]              Who to contact     Please call your clinic at [phone] to:    Ask questions about your health    Make or cancel appointments    Discuss your medicines    Learn about your test results    Speak to your doctor            Additional Information About Your Visit        MyChart Information     [employer] [place] is an electronic gateway that provides easy, online access to your medical records. With [email or web address], you can request a clinic appointment, read your test results, renew a prescription or communicate with your care team.     To sign up for [employer] [place] visit the website at [email or web address]   You will be asked to enter the access code listed below, as well as some personal information. Please follow the directions to create your username and password.     Your access code is: [email or web address]  Expires: 2018  6:30 AM     Your access code will  in 90 days. If you need help or a new code, please contact your [facility] or call [phone] for assistance.        Care  EveryWhere ID     This is your Care EveryWhere ID. This could be used by other organizations to access your [place] medical records  [id number]         Blood Pressure from Last 3 Encounters:   No data found for BP    Weight from Last 3 Encounters:   No data found for Wt              We Performed the Following     OCT Retina Spectralis OU (both eyes)        Primary Care Provider Office Phone # Fax #    Leilani Rangel [phone]       [facility] [address]  [facility] [address]        Equal Access to Services     GAEL PAGE : Hadii aad ku hadasho Soomaali, waaxda luqadaha, qaybta kaalmada adeegyada, waxay idiin hayaan adeeg kharaever ambrose . So [facility] [phone].    ATENCIÓN: Si habla español, tiene a rothman disposición servicios gratuitos de asistencia lingüística. [facility] [phone].    We comply with applicable federal civil rights laws and [place] laws. We do not discriminate on the basis of race, color, national origin, age, disability, sex, sexual orientation, or gender identity.            Thank you!     Thank you for choosing EYE CLINIC  for your care. Our goal is always to provide you with excellent care. Hearing back from our patients is one way we can continue to improve our services. Please take a few minutes to complete the written survey that you may receive in the mail after your visit with us. Thank you!             Your Updated Medication List - Protect others around you: Learn how to safely use, store and throw away your medicines at www.disposemymeds.org.          This list is accurate as of 4/6/18 12:51 PM.  Always use your most recent med list.                   Brand Name Dispense Instructions for use Diagnosis    adalimumab 40 MG/0.8ML pen kit    HUMIRA     Inject 40 mg Subcutaneous every 14 days        calcium citrate-vitamin D 315-200 MG-UNIT Tabs per tablet    CITRACAL     1 tablet        folic acid 1 MG tablet    FOLVITE     Take 1 mg by mouth         levonorgestrel 20 MCG/24HR IUD    MIRENA     20 mcg        methotrexate 2.5 MG tablet CHEMO      3 tablets by mouth once a week. Increase by 2 tablets every week until at 10 tablets once a week.        predniSONE 10 MG tablet    DELTASONE    60 tablet    Take 1 tablet (10 mg) by mouth daily    Intermediate uveitis, bilateral, Cystoid macular degeneration of retina, left

## 2018-04-06 NOTE — NURSING NOTE
Chief Complaints and History of Present Illnesses   Patient presents with     Follow Up For     6wk f/u V/T/D/mac OCT for idiopathic uveitis OU (OD<OS)     HPI    Additional Referring Providers:  Regina   Symptoms:     No blurred vision   No decreased vision   No distorted vision   Floaters   No flashes   No redness   No tearing   No Dryness   No photophobia         Do you have eye pain now?:  No      Comments:  6wk f/u for idiopathic uveitis OU (OD<OS)  Rheumatologist from [place]: Regina for f/u of uveitus (h/o psuedotumor cerebri)    No change in vision since LV. Wears gls from 2yrs ago, would like to know if she could update Rx and if the uveitus has any refractive implications.      Floaters OS, longstanding and unchanging. x5 years    Ocular meds: (no gtts)  prednisone 10 mg/day, MTX 25mg/wk   **will start Humera tmrw**    Crista Ricketts COT 12:05 PM April 6, 2018

## 2018-04-09 PROBLEM — H30.23 INTERMEDIATE UVEITIS OF BOTH EYES: Status: ACTIVE | Noted: 2018-04-09

## 2018-05-07 ENCOUNTER — TELEPHONE (OUTPATIENT)
Dept: OPHTHALMOLOGY | Facility: CLINIC | Age: 33
End: 2018-05-07

## 2018-05-07 NOTE — TELEPHONE ENCOUNTER
Called patient to reschedule 06/22/18 appointment. Left voicemail to see if patient would be able to changer her appointment from 06/22/18 to 06/15/18 in the morning.   Gave patient Porter's call back number ([phone])

## 2018-06-15 ENCOUNTER — OFFICE VISIT (OUTPATIENT)
Dept: OPHTHALMOLOGY | Facility: CLINIC | Age: 33
End: 2018-06-15
Attending: OPHTHALMOLOGY
Payer: COMMERCIAL

## 2018-06-15 DIAGNOSIS — H35.352 CYSTOID MACULAR DEGENERATION OF RETINA, LEFT: ICD-10-CM

## 2018-06-15 DIAGNOSIS — H30.23 INTERMEDIATE UVEITIS OF BOTH EYES: Primary | ICD-10-CM

## 2018-06-15 PROCEDURE — 92134 CPTRZ OPH DX IMG PST SGM RTA: CPT | Mod: ZF | Performed by: OPHTHALMOLOGY

## 2018-06-15 PROCEDURE — G0463 HOSPITAL OUTPT CLINIC VISIT: HCPCS | Mod: ZF

## 2018-06-15 ASSESSMENT — EXTERNAL EXAM - RIGHT EYE: OD_EXAM: NORMAL

## 2018-06-15 ASSESSMENT — VISUAL ACUITY
OD_SC: 20/15
OD_SC+: -1
OS_SC+: -1
METHOD: SNELLEN - LINEAR
OS_SC: 20/20

## 2018-06-15 ASSESSMENT — SLIT LAMP EXAM - LIDS
COMMENTS: NORMAL
COMMENTS: NORMAL

## 2018-06-15 ASSESSMENT — EXTERNAL EXAM - LEFT EYE: OS_EXAM: NORMAL

## 2018-06-15 ASSESSMENT — CONF VISUAL FIELD
OD_NORMAL: 1
METHOD: COUNTING FINGERS
OS_NORMAL: 1

## 2018-06-15 ASSESSMENT — TONOMETRY
OS_IOP_MMHG: 11
IOP_METHOD: APPLANATION
OD_IOP_MMHG: 12

## 2018-06-15 ASSESSMENT — CUP TO DISC RATIO
OS_RATIO: 0.1
OD_RATIO: 0.1

## 2018-06-15 NOTE — MR AVS SNAPSHOT
After Visit Summary   6/15/2018    Suma Schulz    MRN: [id number]           Patient Information     Date Of Birth          1985        Visit Information        Provider Department      6/15/2018 10:00 AM Shannon Dominguez MD [facility]        Today's Diagnoses     Intermediate uveitis of both eyes          Care Instructions    Stop prednisone.          Follow-ups after your visit        Follow-up notes from your care team     Return in about 2 months (around 8/15/2018).      Your next 10 appointments already scheduled     Aug 31, 2018 11:45 AM CDT   RETURN UVEITIS with Shannon Dominguez MD   Eye Clinic ([facility])    [address]  [facility] [address]  [facility] [id number] [phone]              Who to contact     Please call your clinic at [phone] to:    Ask questions about your health    Make or cancel appointments    Discuss your medicines    Learn about your test results    Speak to your doctor            Additional Information About Your Visit        Care EveryWhere ID     This is your Care EveryWhere ID. This could be used by other organizations to access your [place] medical records  [id number]         Blood Pressure from Last 3 Encounters:   No data found for BP    Weight from Last 3 Encounters:   No data found for Wt              We Performed the Following     OCT Retina Spectralis OU (both eyes)        Primary Care Provider Office Phone # Fax #    Leilani Rangel [phone]       [facility] [address]  [facility] [address]        Equal Access to Services     [facility] HospitalFERNANDA : Hadii aad ku hadasho Soomaali, waaxda luqadaha, qaybta kaalmada adeegyada, efra casey haymilan ambrose . So [facility] [phone].    ATENCIÓN: Si habla español, tiene a rothman disposición servicios gratuitos de asistencia lingüística. Keily al [phone].    We comply with applicable federal civil rights laws and  [place] laws. We do not discriminate on the basis of race, color, national origin, age, disability, sex, sexual orientation, or gender identity.            Thank you!     Thank you for choosing EYE CLINIC  for your care. Our goal is always to provide you with excellent care. Hearing back from our patients is one way we can continue to improve our services. Please take a few minutes to complete the written survey that you may receive in the mail after your visit with us. Thank you!             Your Updated Medication List - Protect others around you: Learn how to safely use, store and throw away your medicines at www.disposemymeds.org.          This list is accurate as of 6/15/18 11:08 AM.  Always use your most recent med list.                   Brand Name Dispense Instructions for use Diagnosis    adalimumab 40 MG/0.8ML pen kit    HUMIRA     Inject 40 mg Subcutaneous every 14 days        calcium citrate-vitamin D 315-200 MG-UNIT Tabs per tablet    CITRACAL     1 tablet        folic acid 1 MG tablet    FOLVITE     Take 1 mg by mouth        levonorgestrel 20 MCG/24HR IUD    MIRENA     20 mcg        methotrexate 2.5 MG tablet CHEMO      3 tablets by mouth once a week. Increase by 2 tablets every week until at 10 tablets once a week.        predniSONE 10 MG tablet    DELTASONE    60 tablet    Take 1 tablet (10 mg) by mouth daily    Intermediate uveitis, bilateral, Cystoid macular degeneration of retina, left

## 2018-06-15 NOTE — LETTER
6/15/2018      RE: Suma Schulz  [address] [facility] [address]       CC: uveitis follow up    HPI: Suma Schulz is a 33 year old  female here for uveitis follow up. She has tapered her oral prednisone to 2.5 mg/day for about 2 weeks.  No eye pain and vision is good both eyes.    Ocular medications: prednisone 2.5 mg/day (started at 60 mg/day 9.16.17), Humira q2wk (started 4.7.18), oral methotrexate 25 mg/wk (started 10.16.17, increased to 25 mg/wk 11.13.17) + folic acid. IMT managed by Dr. Tapia ([facility] [place]).  Note: Flared on 25 mg/wk MTX (at max ocular dose 3 months) and 2.5 mg/day oral prednisone.    Ocular history:   1. 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  2. Retinoschisis cavities left eye. S/p laser barricade left eye 8.31.17 (Reagan).  3. IIH diagnosed in 2015 but sympoms started in 2012, treated with 4 months diamox (had side effects: joint swelling and pain). Symptoms resolved near the end of pregnancy.  4. History of red left eye about 10 years ago, treated with a short course of topical corticosteroids, patient unsure of diagnosis.     Medical history:  1. 2 uncomplicated pregnancies (vaginal delivery), although blood pressure was slightly elevated at the end of more recent pregnancy.  2. Not currently pregnant or breastfeeding.  3. Mirena IUD - not planning pregnancy soon.  4. Negative for diabetes mellitus.  5. Scalp cyst.    Social history: [employer] . Never smoked. Alcohol use: ~one beverage monthly.     Laboratory/Imaging Results:  MRI brain/orbits 9.14.17: unremarkable.  Labs 8.31.17: Quantiferon, RPR, Treponema pallidum ab, Lyme, HIV, Toxoplasma IgM/IgG, Bartonella henselae IgM/IgG, anti-Anaplsma phagocytophilum (HGA) IgM/IgG, urinary beta-2 microglobulin, anti-MPO, anti-pro3, ACE, ESR, CRP, and HLA-B27 all normal/negative. HSV1 IgG positive, HSV2 IgG negative.  Complete blood count (CBC) with diff normal. Complete metabolic panel (CMP) unremarkable except for slightly elevated glucose (116 but non-fasting).    Ocular Imagin.15.18 mac OCT:  Right eye: within normal limits, stable vs 4.6.18, 2..18, 1..18 and ..17  Left eye: mild epiretinal membrane, no IRF/SRF, improved vs. ..18 (less retinal thickening and resolution of intraretinal cysts)    Optos fluorescein angiography . shows worse leakage left eye vs 17, which was improved compared to .17.    Impression/Plan:  1. 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 Quiet today on 2.5 mg/day oral prednisone + 25 mg/wk methotrexate + Humira q2wk.   - Continue methotrexate and Humira with Dr. Tapia   - Discontinue oral prednisone    2. 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 Does not extend posterior to laser.   - Continue to monitor     3. Cystoid macular edema left eye, likely secondary to #1. Not present today.   - See management of #1    Return to uveitis clinic in 2 months: V/T/D/mac OCT/Optos fluorescein angiography transit left eye      Attending Physician Attestation:  Complete documentation of historical and exam elements from today's encounter can be found in the full encounter summary report (not reduplicated in this progress note).  I personally obtained the chief complaint(s) and history of present illness.  I confirmed and edited as necessary the review of systems, past medical/surgical history, family history, social history, and examination findings as documented by others; and I examined the patient myself.  I personally reviewed the relevant tests, images, and reports as documented above.  I formulated and edited as necessary the assessment and plan and discussed the findings and management plan with the patient and family.  - Shannon Dominguez M.D.          Shannon Dominguez MD

## 2018-06-15 NOTE — LETTER
Martha 15, 2018      Marta Land MD  [address] [facility] [address]  [place] [address]       RE: Suma Schulz  [address] [facility] [address]       Dear Dr. Land:    Thank you for referring your patient, Suma Schulz, to the EYE CLINIC at [facility]. Please see a copy of my visit note below.    CC: Uveitis follow up    HPI: Suma Schulz is a 33 year old  female here for uveitis follow up. She has tapered her oral prednisone to 2.5 mg/day for about 2 weeks.  No eye pain and vision is good both eyes.    Ocular medications: prednisone 2.5 mg/day (started at 60 mg/day 9.16.17), Humira q2wk (started 4.7.18), oral methotrexate 25 mg/wk (started 10.16.17, increased to 25 mg/wk 11.13.17) + folic acid. IMT managed by Dr. Tapia ([facility]).  Note: Flared on 25 mg/wk MTX (at max ocular dose 3 months) and 2.5 mg/day oral prednisone.    Ocular history:   1. 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  2. Retinoschisis cavities left eye. S/p laser barricade left eye 8.31.17 ([place]).  3. IIH diagnosed in 2015 but symptoms started in 2012, treated with 4 months diamox (had side effects: joint swelling and pain). Symptoms resolved near the end of pregnancy.  4. History of red left eye about 10 years ago, treated with a short course of topical corticosteroids, patient unsure of diagnosis.           Medical history:  1. 2 uncomplicated pregnancies (vaginal delivery), although blood pressure was slightly elevated at the end of more recent pregnancy.  2. Not currently pregnant or breastfeeding.  3. Mirena IUD - not planning pregnancy soon.  4. Negative for diabetes mellitus.  5. Scalp cyst.    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     Laboratory/Imaging Results:  MRI brain/orbits 9.14.17: unremarkable.  Labs  17: Quantiferon, RPR, Treponema pallidum ab, Lyme, HIV, Toxoplasma IgM/IgG, Bartonella henselae IgM/IgG, anti-Anaplsma phagocytophilum (HGA) IgM/IgG, urinary beta-2 microglobulin, anti-MPO, anti-pro3, ACE, ESR, CRP, and HLA-B27 all normal/negative. HSV1 IgG positive, HSV2 IgG negative. Complete blood count (CBC) with diff normal. Complete metabolic panel (CMP) unremarkable except for slightly elevated glucose (116 but non-fasting).    Ocular Imagin.15.18 mac OCT:  Right eye: within normal limits, stable vs 18, 18, 18 and 17  Left eye: mild epiretinal membrane, no IRF/SRF, improved vs. 18 (less retinal thickening and resolution of intraretinal cysts)    Optos fluorescein angiography 18 shows worse leakage left eye vs 17, which was improved compared to 17.    Impression/Plan:  1. 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 Quiet today on 2.5 mg/day oral prednisone + 25 mg/wk methotrexate + Humira q2wk.   - Continue methotrexate and Humira with Dr. Tapia   - Discontinue oral prednisone    2. 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 Does not extend posterior to laser.   - Continue to monitor     3. Cystoid macular edema left eye, likely secondary to #1. Not present today.   - See management of #1    Return to uveitis clinic in 2 months: V/T/D/mac OCT/Optos fluorescein angiography transit left eye      Thank you for allowing me to participate in the care of your patient.        Sincerely,    Shannon Dominguez MD      Cc: Leilani Rangel MD; Chris Aparicio MD; Eldon Tapia MD

## 2018-06-15 NOTE — NURSING NOTE
Chief Complaints and History of Present Illnesses   Patient presents with     Follow Up For     Intermediate uveitis of both eyes     HPI    Last Eye Exam:  4/6/18   Affected eye(s):  Both   Symptoms:        Unknown duration    Frequency:  Constant       Do you have eye pain now?:  No      Comments:  Suma is here today for a follow up of Intermediate uveitis of both eyes  She says her LE eye is not painful, as it were last visit.     Bryson Rodriguez COT 10:16 AM Martha 15, 2018

## 2018-06-15 NOTE — PROGRESS NOTES
CC: uveitis follow up    HPI: Suma Schulz is a 33 year old  female here for uveitis follow up. She has tapered her oral prednisone to 2.5 mg/day for about 2 weeks.  No eye pain and vision is good both eyes.    Ocular medications: prednisone 2.5 mg/day (started at 60 mg/day 9.16.17), Humira q2wk (started 4.7.18), oral methotrexate 25 mg/wk (started 10.16.17, increased to 25 mg/wk 11.13.17) + folic acid. IMT managed by Dr. Tapia ([facility]).  Note: Flared on 25 mg/wk MTX (at max ocular dose 3 months) and 2.5 mg/day oral prednisone.    Ocular history:   1. 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  2. Retinoschisis cavities left eye. S/p laser barricade left eye 8.31.17 ([place]).  3. IIH diagnosed in 2015 but sympoms started in 2012, treated with 4 months diamox (had side effects: joint swelling and pain). Symptoms resolved near the end of pregnancy.  4. History of red left eye about 10 years ago, treated with a short course of topical corticosteroids, patient unsure of diagnosis.     Medical history:  1. 2 uncomplicated pregnancies (vaginal delivery), although blood pressure was slightly elevated at the end of more recent pregnancy.  2. Not currently pregnant or breastfeeding.  3. Mirena IUD - not planning pregnancy soon.  4. Negative for diabetes mellitus.  5. Scalp cyst.    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     Laboratory/Imaging Results:  MRI brain/orbits 9.14.17: unremarkable.  Labs 8.31.17: Quantiferon, RPR, Treponema pallidum ab, Lyme, HIV, Toxoplasma IgM/IgG, Bartonella henselae IgM/IgG, anti-Anaplsma phagocytophilum (HGA) IgM/IgG, urinary beta-2 microglobulin, anti-MPO, anti-pro3, ACE, ESR, CRP, and HLA-B27 all normal/negative. HSV1 IgG positive, HSV2 IgG negative. Complete blood count (CBC) with diff normal. Complete metabolic panel (CMP)  unremarkable except for slightly elevated glucose (116 but non-fasting).    Ocular Imagin.15.18 mac OCT:  Right eye: within normal limits, stable vs 4.6.18, 2.23.18, 1..18 and 11..17  Left eye: mild epiretinal membrane, no IRF/SRF, improved vs. 4..18 (less retinal thickening and resolution of intraretinal cysts)    Optos fluorescein angiography ..18 shows worse leakage left eye vs .., which was improved compared to 8..17.    Impression/Plan:  1. 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 Quiet today on 2.5 mg/day oral prednisone + 25 mg/wk methotrexate + Humira q2wk.   - Continue methotrexate and Humira with Dr. Tapia   - Discontinue oral prednisone    2. 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 Does not extend posterior to laser.   - Continue to monitor     3. Cystoid macular edema left eye, likely secondary to #1. Not present today.   - See management of #1    Return to uveitis clinic in 2 months: V/T/D/mac OCT/Optos fluorescein angiography transit left eye      Attending Physician Attestation:  Complete documentation of historical and exam elements from today's encounter can be found in the full encounter summary report (not reduplicated in this progress note).  I personally obtained the chief complaint(s) and history of present illness.  I confirmed and edited as necessary the review of systems, past medical/surgical history, family history, social history, and examination findings as documented by others; and I examined the patient myself.  I personally reviewed the relevant tests, images, and reports as documented above.  I formulated and edited as necessary the assessment and plan and discussed the findings and management plan with the patient and family.  - Shannon Dominguez M.D.

## 2018-10-05 ENCOUNTER — OFFICE VISIT (OUTPATIENT)
Dept: OPHTHALMOLOGY | Facility: CLINIC | Age: 33
End: 2018-10-05
Attending: OPHTHALMOLOGY
Payer: COMMERCIAL

## 2018-10-05 DIAGNOSIS — H35.352 CYSTOID MACULAR DEGENERATION OF RETINA, LEFT: ICD-10-CM

## 2018-10-05 DIAGNOSIS — H30.23 INTERMEDIATE UVEITIS OF BOTH EYES: Primary | ICD-10-CM

## 2018-10-05 PROCEDURE — G0463 HOSPITAL OUTPT CLINIC VISIT: HCPCS | Mod: 25,ZF

## 2018-10-05 PROCEDURE — 92134 CPTRZ OPH DX IMG PST SGM RTA: CPT | Mod: ZF | Performed by: OPHTHALMOLOGY

## 2018-10-05 PROCEDURE — 92235 FLUORESCEIN ANGRPH MLTIFRAME: CPT | Mod: ZF | Performed by: OPHTHALMOLOGY

## 2018-10-05 ASSESSMENT — VISUAL ACUITY
OS_CC: 20/20
METHOD: SNELLEN - LINEAR
OD_CC: 20/20
CORRECTION_TYPE: GLASSES

## 2018-10-05 ASSESSMENT — CUP TO DISC RATIO
OD_RATIO: 0.1
OS_RATIO: 0.1

## 2018-10-05 ASSESSMENT — REFRACTION_WEARINGRX
OD_SPHERE: -0.25
OS_AXIS: 078
OD_CYLINDER: +0.75
OS_SPHERE: -1.50
OS_CYLINDER: +2.00
OD_AXIS: 094
SPECS_TYPE: SVL

## 2018-10-05 ASSESSMENT — SLIT LAMP EXAM - LIDS
COMMENTS: NORMAL
COMMENTS: NORMAL

## 2018-10-05 ASSESSMENT — EXTERNAL EXAM - RIGHT EYE: OD_EXAM: NORMAL

## 2018-10-05 ASSESSMENT — EXTERNAL EXAM - LEFT EYE: OS_EXAM: NORMAL

## 2018-10-05 NOTE — PROGRESS NOTES
HPI: Suma Schulz is a 33 year old  female here for uveitis follow up. No eye pain and vision is good both eyes; she has been off oral prednisone since last visit. Tolerating other medications.    Ocular medications: Humira q2wk (started 4.7.18), oral methotrexate 25 mg/wk (started 10.16.17, increased to 25 mg/wk 11.13.17) + folic acid. IMT managed by Dr. Tapia ([facility]).  Note: Flared on 25 mg/wk MTX (at max ocular dose 3 months) and 2.5 mg/day oral prednisone.    Prior ocular medications: oral prednisone (started at 60 mg/day 9.16.17 and tapered, off since 6.15.18).    Ocular history:   1. 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  2. Retinoschisis cavities left eye. S/p laser barricade left eye 8.31.17 ([place]).  3. IIH diagnosed in 2015 but sympoms started in 2012, treated with 4 months diamox (had side effects: joint swelling and pain). Symptoms resolved near the end of pregnancy.  4. History of red left eye about 10 years ago, treated with a short course of topical corticosteroids, patient unsure of diagnosis.     Medical history:  1. 2 uncomplicated pregnancies (vaginal delivery), although blood pressure was slightly elevated at the end of more recent pregnancy.  2. Not currently pregnant or breastfeeding.  3. Mirena IUD - not planning pregnancy soon.  4. Negative for diabetes mellitus.  5. Scalp cyst.    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     Laboratory/Imaging Results:  MRI brain/orbits 9.14.17: unremarkable.  Labs 8.31.17: Quantiferon, RPR, Treponema pallidum ab, Lyme, HIV, Toxoplasma IgM/IgG, Bartonella henselae IgM/IgG, anti-Anaplsma phagocytophilum (HGA) IgM/IgG, urinary beta-2 microglobulin, anti-MPO, anti-pro3, ACE, ESR, CRP, and HLA-B27 all normal/negative. HSV1 IgG positive, HSV2 IgG negative. Complete blood count (CBC) with diff  normal. Complete metabolic panel (CMP) unremarkable except for slightly elevated glucose (116 but non-fasting).    Ocular Imaging:  10.5.18 mac OCT:  Right eye: within normal limits, stable vs 6.15.18, 4.6.18, 2.23.18, 1.5.18 and 11.17.17  Left eye: mild epiretinal membrane, no IRF/SRF, stable vs 6.15.18 and improved vs. 4.6.18 (less retinal thickening and resolution of intraretinal cysts)    Optos fluorescein angiography 10.5.18:  Right eye: essentially within normal limits, no optic nerve head or large vessel leakage  Left eye: essentially complete resolution of optic nerve head/macular/large vessel/schisis cavity leakage seen in 2.23.18 fluorescein angiography    Impression/Plan:  1. 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 Quiet today on 25 mg/wk methotrexate + Humira q2wk.   - Continue methotrexate and Humira with Dr. Tapia   - Discussed long-term management with patient. If her eyes remain quiet for >= 1 year (clock starts 10.5.18) on current treatment and off prednisone, may consider tapering her IMT and carefully monitoring her response. Either methotrexate or Humira may be tapered first, but my preference would be to slowly taper methotrexate first.    2. 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 Does not extend posterior to laser.   - Continue to monitor     3. Cystoid macular edema left eye, likely secondary to #1. Not present today.   - See management of #1    Return to uveitis or retina ([place]) clinic in 4 months: V/T/D/mac OCT      Attending Physician Attestation:  Complete documentation of historical and exam elements from today's encounter can be found in the full encounter summary report (not reduplicated in this progress note).  I personally obtained the chief complaint(s) and history of present illness.  I confirmed and edited as necessary the review of systems, past medical/surgical history, family history, social history, and examination  findings as documented by others; and I examined the patient myself.  I personally reviewed the relevant tests, images, and reports as documented above.  I formulated and edited as necessary the assessment and plan and discussed the findings and management plan with the patient and family.  - Shannon Dominguez M.D.

## 2018-10-05 NOTE — NURSING NOTE
Chief Complaints and History of Present Illnesses   Patient presents with     Uveitis Follow Up     4mo bilateral uveitis + retinal vasculitis OS>OD recheck      HPI    Additional Referring Providers:  Eldon Tapia MD   Symptoms:              Comments:  Vision is stable since LV, wears gls full time and were updated last week at [facility] eye clinic in [place].    Floaters left eye, longstanding and unchanging.     Ocular meds: no gtts  MTX 2.5MG qWk, Humira qEvery other wk    Crista Ricketts COT 12:10 PM October 5, 2018

## 2018-10-05 NOTE — MR AVS SNAPSHOT
After Visit Summary   10/5/2018    Suma Schulz    MRN: [id number]           Patient Information     Date Of Birth          1985        Visit Information        Provider Department      10/5/2018 12:00 PM Shannon Dominguez MD Eye Clinic        Today's Diagnoses     Intermediate uveitis of both eyes        Cystoid macular degeneration of retina, left           Follow-ups after your visit        Follow-up notes from your care team     Return in about 4 months (around 2019) for Dr. Dominguez or Sh[facility].      Your next 10 appointments already scheduled     Feb 15, 2019 12:00 PM CST   RETURN UVEITIS with Shannon Dominguez MD   Eye Clinic ([facility])    [address]  [address] [address]  [facility] [address] [phone]              Who to contact     Please call your clinic at [phone] to:    Ask questions about your health    Make or cancel appointments    Discuss your medicines    Learn about your test results    Speak to your doctor            Additional Information About Your Visit        MyChart Information     [employer] is an electronic gateway that provides easy, online access to your medical records. With [employer], you can request a clinic appointment, read your test results, renew a prescription or communicate with your care team.     To sign up for [employer] visit the website at [email or web address]   You will be asked to enter the access code listed below, as well as some personal information. Please follow the directions to create your username and password.     Your access code is: [email or web address]  Expires: 2018  6:31 AM     Your access code will  in 90 days. If you need help or a new code, please contact your [facility] or call [phone] for assistance.        Care EveryWhere ID     This is your Care EveryWhere ID. This could be used by other organizations to access your [facility]  medical records  [id number]         Blood Pressure from Last 3 Encounters:   No data found for BP    Weight from Last 3 Encounters:   No data found for Wt              We Performed the Following     Fluorescein Angiography OU (both eyes)     OCT Retina Spectralis OU (both eyes)        Primary Care Provider Office Phone # Fax #    Leilani Rangel [phone]       [facility] [address]  [facility] [address]        Equal Access to Services     GAEL PAGE : Hadii aad ku hadasho Soomaali, waaxda luqadaha, qaybta kaalmada adeegyada, waxay idiin hayaan adeeg kharash la'aan . So [facility] [phone].    ATENCIÓN: Si britt armendariz, tiene a rothman disposición servicios gratuitos de asistencia lingüística. Ll[facility] [phone].    We comply with applicable federal civil rights laws and [place] laws. We do not discriminate on the basis of race, color, national origin, age, disability, sex, sexual orientation, or gender identity.            Thank you!     Thank you for choosing EYE CLINIC  for your care. Our goal is always to provide you with excellent care. Hearing back from our patients is one way we can continue to improve our services. Please take a few minutes to complete the written survey that you may receive in the mail after your visit with us. Thank you!             Your Updated Medication List - Protect others around you: Learn how to safely use, store and throw away your medicines at www.disposemymeds.org.          This list is accurate as of 10/5/18  2:31 PM.  Always use your most recent med list.                   Brand Name Dispense Instructions for use Diagnosis    adalimumab 40 MG/0.8ML pen kit    HUMIRA     Inject 40 mg Subcutaneous every 14 days        calcium citrate-vitamin D 315-200 MG-UNIT Tabs per tablet    CITRACAL     1 tablet        folic acid 1 MG tablet    FOLVITE     Take 1 mg by mouth        levonorgestrel 20 MCG/24HR IUD    MIRENA     20 mcg        methotrexate 2.5 MG  tablet CHEMO      3 tablets by mouth once a week. Increase by 2 tablets every week until at 10 tablets once a week.

## 2019-03-15 ENCOUNTER — OFFICE VISIT (OUTPATIENT)
Dept: OPHTHALMOLOGY | Facility: CLINIC | Age: 34
End: 2019-03-15
Attending: OPHTHALMOLOGY
Payer: COMMERCIAL

## 2019-03-15 DIAGNOSIS — H30.23 INTERMEDIATE UVEITIS OF BOTH EYES: Primary | ICD-10-CM

## 2019-03-15 DIAGNOSIS — H35.372 EPIRETINAL MEMBRANE (ERM) OF LEFT EYE: ICD-10-CM

## 2019-03-15 DIAGNOSIS — H35.352 CYSTOID MACULAR DEGENERATION OF RETINA, LEFT: ICD-10-CM

## 2019-03-15 PROCEDURE — 92134 CPTRZ OPH DX IMG PST SGM RTA: CPT | Mod: ZF | Performed by: OPHTHALMOLOGY

## 2019-03-15 PROCEDURE — G0463 HOSPITAL OUTPT CLINIC VISIT: HCPCS | Mod: ZF

## 2019-03-15 ASSESSMENT — VISUAL ACUITY
METHOD: SNELLEN - LINEAR
OS_SC: 20/40
OD_SC: 20/20
OS_PH_SC: 20/30

## 2019-03-15 ASSESSMENT — CUP TO DISC RATIO
OS_RATIO: 0.1
OD_RATIO: 0.1

## 2019-03-15 ASSESSMENT — SLIT LAMP EXAM - LIDS
COMMENTS: NORMAL
COMMENTS: NORMAL

## 2019-03-15 ASSESSMENT — EXTERNAL EXAM - LEFT EYE: OS_EXAM: NORMAL

## 2019-03-15 ASSESSMENT — CONF VISUAL FIELD
METHOD: COUNTING FINGERS
OD_NORMAL: 1
OS_NORMAL: 1

## 2019-03-15 ASSESSMENT — TONOMETRY
OS_IOP_MMHG: 10
OD_IOP_MMHG: 13
IOP_METHOD: TONOPEN

## 2019-03-15 ASSESSMENT — EXTERNAL EXAM - RIGHT EYE: OD_EXAM: NORMAL

## 2019-03-15 NOTE — LETTER
March 15, 2019      Eldon Tapia MD  [facility]  [address] [place] [address]  Fax: [phone]       RE: Suma Schulz  [address] [facility] [address]         Dear Colleague,    Thank you for referring your patient, Suma Schulz, to the EYE CLINIC at [facility]. Please see a copy of my visit note below.    HPI: Suma Schulz is a 34 year old  female here for uveitis follow up. No eye pain and vision is good both eyes. Tolerating Humira and methotrexate (although mild nausea with methotrexate). Forgot glasses today but states vision with glasses on is good in both eyes. Switched rheumatologists because Dr. Tapia left - now IMT managed by Dr. Ruchi Martin.    Ocular medications: Humira q2wk (started 4.7.18), oral methotrexate 25 mg/wk (started 10.16.17, increased to 25 mg/wk 11.13.17) + folic acid. IMT managed by Dr. Ruchi Martin ([facility] [place]).  Note: Previously flared on 25 mg/wk MTX (at max ocular dose 3 months) and 2.5 mg/day oral prednisone.    Prior ocular medications: oral prednisone (started at 60 mg/day 9.16.17 and tapered, off since 6.15.18).    Ocular history:   1. 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  2. Retinoschisis cavities left eye. S/p laser barricade left eye 8.31.17 ([place]).  3. IIH diagnosed in 2015 but symptoms started in 2012, treated with 4 months diamox (had side effects: joint swelling and pain). Symptoms resolved near the end of pregnancy.  4. History of red left eye about 10 years ago, treated with a short course of topical corticosteroids, patient unsure of diagnosis.     Medical history:  1. 2 uncomplicated pregnancies (vaginal delivery), although blood pressure was slightly elevated at the end of more recent pregnancy.  2. Not currently pregnant or breastfeeding.  3. Mirena IUD - not planning pregnancy  soon.  4. Negative for diabetes mellitus.  5. Scalp cyst.    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     Laboratory/Imaging Results:  MRI brain/orbits 9.14.17: unremarkable.  Labs 8.31.17: Quantiferon, RPR, Treponema pallidum ab, Lyme, HIV, Toxoplasma IgM/IgG, Bartonella henselae IgM/IgG, anti-Anaplsma phagocytophilum (HGA) IgM/IgG, urinary beta-2 microglobulin, anti-MPO, anti-pro3, ACE, ESR, CRP, and HLA-B27 all normal/negative. HSV1 IgG positive, HSV2 IgG negative. Complete blood count (CBC) with diff normal. Complete metabolic panel (CMP) unremarkable except for slightly elevated glucose (116 but non-fasting).    Ocular Imaging:  3.15.19 mac OCT:  Right eye: within normal limits, stable vs 10.5.18, 6.15.18, 4.6.18, 2.23.18, 1.5.18 and 11.17.17  Left eye: mild epiretinal membrane, no IRF/SRF, stable vs 10.5.18 and 6.15.18 and improved vs. 4.6.18 (less retinal thickening and resolution of intraretinal cysts)    Optos fluorescein angiography 10.5.18:  Right eye: essentially within normal limits, no optic nerve head or large vessel leakage  Left eye: essentially complete resolution of optic nerve head/macular/large vessel/schisis cavity leakage seen in 2.23.18 fluorescein angiography    Impression/Plan:  1. 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 Quiet today on 25 mg/wk methotrexate + Humira q2wk.   - Continue methotrexate and Humira with Dr. Martin   - Discussed long-term management with patient. If her eyes remain quiet for >= 1 year (clock starts                 10.5.18) on current treatment and off prednisone, may consider tapering her IMT and carefully                 monitoring her response. Either methotrexate or Humira may be tapered first, but my preference                 would be to slowly taper methotrexate first, since Humira appeared to be more efficacious for her                 uveitis and also patient has  some nausea with methotrexate.    2. Bullous retinoschisis left eye, s/p barricade laser. Does not extend posterior to laser.   - Monitor     3. Cystoid macular edema left eye, likely secondary to #1. Not present today.   - See management of #1    4. Epiretinal membrane left eye, not visually significant.   - Monitor    Return to uveitis or retina ([facility]) clinic in 6 months: V/T/D/mac OCT/Optos fluorescein angiography. If uveitis is quiescent and stable, will discuss tapering methotrexate.               Again, thank you for allowing me to participate in the care of your patient.        Sincerely,      Shannon Dominguez MD      Cc: Marta Land [place]

## 2019-03-15 NOTE — PROGRESS NOTES
HPI: Suma Schulz is a 34 year old  female here for uveitis follow up. No eye pain and vision is good both eyes. Tolerating Humira and methotrexate (although mild nausea with methotrexate). Forgot glasses today but states vision with glasses on is good in both eyes. Switched rheumatologists because Dr. Tapia left - now IMT managed by Dr. Ruchi Martin.    Ocular medications: Humira q2wk (started 4.7.18), oral methotrexate 25 mg/wk (started 10.16.17, increased to 25 mg/wk 11.13.17) + folic acid. IMT managed by Dr. Ruchi Martin ([facility]).  Note: Previously flared on 25 mg/wk MTX (at max ocular dose 3 months) and 2.5 mg/day oral prednisone.    Prior ocular medications: oral prednisone (started at 60 mg/day 9.16.17 and tapered, off since 6.15.18).    Ocular history:   1. 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  2. Retinoschisis cavities left eye. S/p laser barricade left eye 8.31.17 ([place]).  3. IIH diagnosed in 2015 but sympoms started in 2012, treated with 4 months diamox (had side effects: joint swelling and pain). Symptoms resolved near the end of pregnancy.  4. History of red left eye about 10 years ago, treated with a short course of topical corticosteroids, patient unsure of diagnosis.     Medical history:  1. 2 uncomplicated pregnancies (vaginal delivery), although blood pressure was slightly elevated at the end of more recent pregnancy.  2. Not currently pregnant or breastfeeding.  3. Mirena IUD - not planning pregnancy soon.  4. Negative for diabetes mellitus.  5. Scalp cyst.    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     Laboratory/Imaging Results:  MRI brain/orbits 9.14.17: unremarkable.  Labs 8.31.17: Quantiferon, RPR, Treponema pallidum ab, Lyme, HIV, Toxoplasma IgM/IgG, Bartonella henselae IgM/IgG, anti-Anaplsma phagocytophilum (HGA)  IgM/IgG, urinary beta-2 microglobulin, anti-MPO, anti-pro3, ACE, ESR, CRP, and HLA-B27 all normal/negative. HSV1 IgG positive, HSV2 IgG negative. Complete blood count (CBC) with diff normal. Complete metabolic panel (CMP) unremarkable except for slightly elevated glucose (116 but non-fasting).    Ocular Imaging:  3.15.19 mac OCT:  Right eye: within normal limits, stable vs 10.5.18, 6.15.18, 4.6.18, 2.23.18, 1.5.18 and 11.17.17  Left eye: mild epiretinal membrane, no IRF/SRF, stable vs 10.5.18 and 6.15.18 and improved vs. 4.6.18 (less retinal thickening and resolution of intraretinal cysts)    Optos fluorescein angiography 10.5.18:  Right eye: essentially within normal limits, no optic nerve head or large vessel leakage  Left eye: essentially complete resolution of optic nerve head/macular/large vessel/schisis cavity leakage seen in 2.23.18 fluorescein angiography    Impression/Plan:  1. 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 Quiet today on 25 mg/wk methotrexate + Humira q2wk.   - Continue methotrexate and Humira with Dr. Martin   - Discussed long-term management with patient. If her eyes remain quiet for >= 1 year (clock starts 10.5.18) on current treatment and off prednisone, may consider tapering her IMT and carefully monitoring her response. Either methotrexate or Humira may be tapered first, but my preference would be to slowly taper methotrexate first, since Humira appeared to be more efficacious for her uveitis and also patient has some nausea with methotrexate.    2. Bullous retinoschisis left eye, s/p barricade laser. Does not extend posterior to laser.   - Monitor     3. Cystoid macular edema left eye, likely secondary to #1. Not present today.   - See management of #1    4. Epiretinal membrane left eye, not visually significant.   - Monitor    Return to uveitis or retina ([place]) clinic in 6 months: V/T/D/mac OCT/Optos fluorescein angiography. If uveitis is  quiescent and stable, will discuss tapering methotrexate.       Attending Physician Attestation:  Complete documentation of historical and exam elements from today's encounter can be found in the full encounter summary report (not reduplicated in this progress note).  I personally obtained the chief complaint(s) and history of present illness.  I confirmed and edited as necessary the review of systems, past medical/surgical history, family history, social history, and examination findings as documented by others; and I examined the patient myself.  I personally reviewed the relevant tests, images, and reports as documented above.  I formulated and edited as necessary the assessment and plan and discussed the findings and management plan with the patient and family.  - Shannon Dominguez M.D.

## 2019-03-15 NOTE — NURSING NOTE
Chief Complaints and History of Present Illnesses   Patient presents with     Uveitis Follow-Up     Chief Complaint(s) and History of Present Illness(es)     Uveitis Follow-Up     Laterality: both eyes    Onset: gradual    Onset: months ago    Quality: States va is the same since last visit      Frequency: constantly    Associated symptoms: Negative for floaters, flashes, dryness, redness and tearing    Treatments tried: no treatments    Pain scale: 0/10              Comments     Majo CHAIREZ 10:08 AM March 15, 2019

## 2019-03-15 NOTE — LETTER
3/15/2019      RE: Suma Schulz  [address] [facility] [address]       HPI: Suma Schulz is a 34 year old  female here for uveitis follow up. No eye pain and vision is good both eyes. Tolerating Humira and methotrexate (although mild nausea with methotrexate). Forgot glasses today but states vision with glasses on is good in both eyes. Switched rheumatologists because Dr. Tapia left - now IMT managed by Dr. Ruchi Martin.    Ocular medications: Humira q2wk (started 4.7.18), oral methotrexate 25 mg/wk (started 10.16.17, increased to 25 mg/wk 11.13.17) + folic acid. IMT managed by Dr. Ruchi Martin ([facility] [place]).  Note: Previously flared on 25 mg/wk MTX (at max ocular dose 3 months) and 2.5 mg/day oral prednisone.    Prior ocular medications: oral prednisone (started at 60 mg/day 9.16.17 and tapered, off since 6.15.18).    Ocular history:   1. 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  2. Retinoschisis cavities left eye. S/p laser barricade left eye 8.31.17 ([place]).  3. IIH diagnosed in 2015 but sympoms started in 2012, treated with 4 months diamox (had side effects: joint swelling and pain). Symptoms resolved near the end of pregnancy.  4. History of red left eye about 10 years ago, treated with a short course of topical corticosteroids, patient unsure of diagnosis.     Medical history:  1. 2 uncomplicated pregnancies (vaginal delivery), although blood pressure was slightly elevated at the end of more recent pregnancy.  2. Not currently pregnant or breastfeeding.  3. Mirena IUD - not planning pregnancy soon.  4. Negative for diabetes mellitus.  5. Scalp cyst.    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     Laboratory/Imaging Results:  MRI brain/orbits 9.14.17: unremarkable.  Labs 8.31.17: Quantiferon, RPR, Treponema pallidum ab, Lyme, HIV,  Toxoplasma IgM/IgG, Bartonella henselae IgM/IgG, anti-Anaplsma phagocytophilum (HGA) IgM/IgG, urinary beta-2 microglobulin, anti-MPO, anti-pro3, ACE, ESR, CRP, and HLA-B27 all normal/negative. HSV1 IgG positive, HSV2 IgG negative. Complete blood count (CBC) with diff normal. Complete metabolic panel (CMP) unremarkable except for slightly elevated glucose (116 but non-fasting).    Ocular Imaging:  3.15.19 mac OCT:  Right eye: within normal limits, stable vs 10.5.18, 6.15.18, 4.6.18, 2.23.18, 1.5.18 and 11.17.17  Left eye: mild epiretinal membrane, no IRF/SRF, stable vs 10.5.18 and 6.15.18 and improved vs. 4.6.18 (less retinal thickening and resolution of intraretinal cysts)    Optos fluorescein angiography 10.5.18:  Right eye: essentially within normal limits, no optic nerve head or large vessel leakage  Left eye: essentially complete resolution of optic nerve head/macular/large vessel/schisis cavity leakage seen in 2.23.18 fluorescein angiography    Impression/Plan:  1. 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 Quiet today on 25 mg/wk methotrexate + Humira q2wk.   - Continue methotrexate and Humira with Dr. Martin   - Discussed long-term management with patient. If her eyes remain quiet for >= 1 year (clock starts 10.5.18) on current treatment and off prednisone, may consider tapering her IMT and carefully monitoring her response. Either methotrexate or Humira may be tapered first, but my preference would be to slowly taper methotrexate first, since Humira appeared to be more efficacious for her uveitis and also patient has some nausea with methotrexate.    2. Bullous retinoschisis left eye, s/p barricade laser. Does not extend posterior to laser.   - Monitor     3. Cystoid macular edema left eye, likely secondary to #1. Not present today.   - See management of #1    4. Epiretinal membrane left eye, not visually significant.   - Monitor    Return to uveitis or retina ([place])  clinic in 6 months: V/T/D/mac OCT/Optos fluorescein angiography. If uveitis is quiescent and stable, will discuss tapering methotrexate.       Attending Physician Attestation:  Complete documentation of historical and exam elements from today's encounter can be found in the full encounter summary report (not reduplicated in this progress note).  I personally obtained the chief complaint(s) and history of present illness.  I confirmed and edited as necessary the review of systems, past medical/surgical history, family history, social history, and examination findings as documented by others; and I examined the patient myself.  I personally reviewed the relevant tests, images, and reports as documented above.  I formulated and edited as necessary the assessment and plan and discussed the findings and management plan with the patient and family.  - Shannon Dominguez M.D.          Shannon Dominguez MD

## 2019-09-20 ENCOUNTER — OFFICE VISIT (OUTPATIENT)
Dept: OPHTHALMOLOGY | Facility: CLINIC | Age: 34
End: 2019-09-20
Attending: OPHTHALMOLOGY
Payer: COMMERCIAL

## 2019-09-20 DIAGNOSIS — H30.23 INTERMEDIATE UVEITIS OF BOTH EYES: Primary | ICD-10-CM

## 2019-09-20 DIAGNOSIS — H35.372 EPIRETINAL MEMBRANE (ERM) OF LEFT EYE: ICD-10-CM

## 2019-09-20 DIAGNOSIS — H35.352 CYSTOID MACULAR DEGENERATION OF RETINA, LEFT: ICD-10-CM

## 2019-09-20 PROCEDURE — 92134 CPTRZ OPH DX IMG PST SGM RTA: CPT | Mod: ZF | Performed by: OPHTHALMOLOGY

## 2019-09-20 PROCEDURE — G0463 HOSPITAL OUTPT CLINIC VISIT: HCPCS | Mod: ZF

## 2019-09-20 PROCEDURE — 92235 FLUORESCEIN ANGRPH MLTIFRAME: CPT | Mod: ZF | Performed by: OPHTHALMOLOGY

## 2019-09-20 ASSESSMENT — CUP TO DISC RATIO
OD_RATIO: 0.1
OS_RATIO: 0.1

## 2019-09-20 ASSESSMENT — REFRACTION_WEARINGRX
OD_SPHERE: -0.25
OS_CYLINDER: +2.00
OD_CYLINDER: +0.75
OS_SPHERE: -1.50
OD_AXIS: 094
SPECS_TYPE: SVL
OS_AXIS: 078

## 2019-09-20 ASSESSMENT — SLIT LAMP EXAM - LIDS
COMMENTS: NORMAL
COMMENTS: NORMAL

## 2019-09-20 ASSESSMENT — VISUAL ACUITY
OS_CC+: -1
OD_CC: 20/20
OS_CC: 20/20
METHOD: SNELLEN - LINEAR

## 2019-09-20 ASSESSMENT — EXTERNAL EXAM - RIGHT EYE: OD_EXAM: NORMAL

## 2019-09-20 ASSESSMENT — TONOMETRY
OS_IOP_MMHG: 10
OD_IOP_MMHG: 11
IOP_METHOD: APPLANATION

## 2019-09-20 ASSESSMENT — EXTERNAL EXAM - LEFT EYE: OS_EXAM: NORMAL

## 2019-09-20 ASSESSMENT — CONF VISUAL FIELD
OS_NORMAL: 1
METHOD: COUNTING FINGERS

## 2019-09-20 NOTE — LETTER
9/20/2019      RE: Suma Schulz  [address] [facility] [address]     HPI: Suma Schulz is a 34 year old  female here for uveitis follow up. No eye pain and vision is good both eyes.    Ocular medications: Humira q2wk (started 4.7.18), oral methotrexate 25 mg/wk (started 10.16.17, increased to 25 mg/wk 11.13.17) + folic acid. IMT managed by Dr. Ruchi Martin ([facility]).  Note: Previously flared on 25 mg/wk MTX (at max ocular dose 3 months) and 2.5 mg/day oral prednisone.    Prior ocular medications: oral prednisone (started at 60 mg/day 9.16.17 and tapered, off since 6.15.18).    Ocular history:   1. 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  2. Retinoschisis cavities left eye. S/p laser barricade left eye 8.31.17 ([place]).  3. IIH diagnosed in 2015 but sympoms started in 2012, treated with 4 months diamox (had side effects: joint swelling and pain). Symptoms resolved near the end of pregnancy.  4. History of red left eye about 10 years ago, treated with a short course of topical corticosteroids, patient unsure of diagnosis.     Medical history:  1. 2 uncomplicated pregnancies (vaginal delivery), although blood pressure was slightly elevated at the end of more recent pregnancy.  2. Not currently pregnant or breastfeeding.  3. Mirena IUD - not planning pregnancy soon.  4. Negative for diabetes mellitus.  5. Scalp cyst.    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     Laboratory/Imaging Results:  MRI brain/orbits 9.14.17: unremarkable.  Labs 8.31.17: Quantiferon, RPR, Treponema pallidum ab, Lyme, HIV, Toxoplasma IgM/IgG, Bartonella henselae IgM/IgG, anti-Anaplsma phagocytophilum (HGA) IgM/IgG, urinary beta-2 microglobulin, anti-MPO, anti-pro3, ACE, ESR, CRP, and HLA-B27 all normal/negative. HSV1 IgG positive, HSV2 IgG negative. Complete blood count  (CBC) with diff normal. Complete metabolic panel (CMP) unremarkable except for slightly elevated glucose (116 but non-fasting).    Ocular Imagin19 mac OCT:  Right eye: within normal limits, stable vs 3.15.19  Left eye: mild epiretinal membrane, no IRF/SRF, improved centrally (less thickening) vs 3.15.19    Optos fluorescein angiography 19:  Right eye: essentially within normal limits, no optic nerve head or large vessel leakage  Left eye: essentially complete resolution of optic nerve head/macular/large vessel/schisis cavity leakage seen in 18 fluorescein angiography, illdefined hyperF at vitreous condensation IT arcade    Impression/Plan:  1. 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 Quiet today on 25 mg/wk methotrexate + Humira q2wk. Vitreous deposit left macula first noted 3.15.19 has been stable. Macular OCT left eye shows continued improvement today vs 6 months ago.   - Continue methotrexate and Humira with Dr. Martin   - Discussed long-term management with patient. Her eyes have been quiescent for nearly 1 year (clock starts 10.5.18) on current treatment and off prednisone, but testing left eye today continues to show improvement (rather than stability), so I recommend waiting another 6 months and then reassessing medications. Either methotrexate or Humira may be tapered first, but my preference would be to slowly taper methotrexate first, since Humira appeared to be more efficacious for her uveitis and also patient has some nausea with methotrexate. Will consider tapering methotrexate if eyes remain stable in 6 months (2020).    2. Bullous retinoschisis left eye, s/p barricade laser. Does not extend posterior to laser.   - Monitor     3. Cystoid macular edema left eye, likely secondary to #1. Not present today.   - See management of #1    4. Epiretinal membrane left eye, not visually significant.   - Monitor    Return to uveitis or retina  ([place]) clinic in 6 months: V/T/D/mac OCT/cube over IT arcade left eye/Optos fluorescein angiography. If uveitis is quiescent and stable, will discuss tapering methotrexate.       Attending Physician Attestation:  Complete documentation of historical and exam elements from today's encounter can be found in the full encounter summary report (not reduplicated in this progress note).  I personally obtained the chief complaint(s) and history of present illness.  I confirmed and edited as necessary the review of systems, past medical/surgical history, family history, social history, and examination findings as documented by others; and I examined the patient myself.  I personally reviewed the relevant tests, images, and reports as documented above.  I formulated and edited as necessary the assessment and plan and discussed the findings and management plan with the patient and family.    Shannon Dominguez MD, PhD  Uveitis  Department of Ophthalmology & Visual Neurosciences  [facility]

## 2019-09-20 NOTE — PROGRESS NOTES
HPI: Suma Scuhlz is a 34 year old  female here for uveitis follow up. No eye pain and vision is good both eyes.    Ocular medications: Humira q2wk (started 4.7.18), oral methotrexate 25 mg/wk (started 10.16.17, increased to 25 mg/wk 11.13.17) + folic acid. IMT managed by Dr. Ruchi Martin ([facility]).  Note: Previously flared on 25 mg/wk MTX (at max ocular dose 3 months) and 2.5 mg/day oral prednisone.    Prior ocular medications: oral prednisone (started at 60 mg/day 9.16.17 and tapered, off since 6.15.18).    Ocular history:   1. 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  2. Retinoschisis cavities left eye. S/p laser barricade left eye 8.31.17 ([place]).  3. IIH diagnosed in 2015 but sympoms started in 2012, treated with 4 months diamox (had side effects: joint swelling and pain). Symptoms resolved near the end of pregnancy.  4. History of red left eye about 10 years ago, treated with a short course of topical corticosteroids, patient unsure of diagnosis.     Medical history:  1. 2 uncomplicated pregnancies (vaginal delivery), although blood pressure was slightly elevated at the end of more recent pregnancy.  2. Not currently pregnant or breastfeeding.  3. Mirena IUD - not planning pregnancy soon.  4. Negative for diabetes mellitus.  5. Scalp cyst.    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     Laboratory/Imaging Results:  MRI brain/orbits 9.14.17: unremarkable.  Labs 8.31.17: Quantiferon, RPR, Treponema pallidum ab, Lyme, HIV, Toxoplasma IgM/IgG, Bartonella henselae IgM/IgG, anti-Anaplsma phagocytophilum (HGA) IgM/IgG, urinary beta-2 microglobulin, anti-MPO, anti-pro3, ACE, ESR, CRP, and HLA-B27 all normal/negative. HSV1 IgG positive, HSV2 IgG negative. Complete blood count (CBC) with diff normal. Complete metabolic panel (CMP) unremarkable except for  slightly elevated glucose (116 but non-fasting).    Ocular Imagin19 mac OCT:  Right eye: within normal limits, stable vs 3.15.19  Left eye: mild epiretinal membrane, no IRF/SRF, improved centrally (less thickening) vs 3.15.19    Optos fluorescein angiography 19:  Right eye: essentially within normal limits, no optic nerve head or large vessel leakage  Left eye: essentially complete resolution of optic nerve head/macular/large vessel/schisis cavity leakage seen in 18 fluorescein angiography, illdefined hyperF at vitreous condensation IT arcade    Impression/Plan:  1. 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 Quiet today on 25 mg/wk methotrexate + Humira q2wk. Vitreous deposit left macula first noted 3.15.19 has been stable. Macular OCT left eye shows continued improvement today vs 6 months ago.   - Continue methotrexate and Humira with Dr. Martin   - Discussed long-term management with patient. Her eyes have been quiescent for nearly 1 year (clock starts 10.5.18) on current treatment and off prednisone, but testing left eye today continues to show improvement (rather than stability), so I recommend waiting another 6 months and then reassessing medications. Either methotrexate or Humira may be tapered first, but my preference would be to slowly taper methotrexate first, since Humira appeared to be more efficacious for her uveitis and also patient has some nausea with methotrexate. Will consider tapering methotrexate if eyes remain stable in 6 months (2020).    2. Bullous retinoschisis left eye, s/p barricade laser. Does not extend posterior to laser.   - Monitor     3. Cystoid macular edema left eye, likely secondary to #1. Not present today.   - See management of #1    4. Epiretinal membrane left eye, not visually significant.   - Monitor    Return to uveitis or retina ([place]) clinic in 6 months: V/T/D/mac OCT/cube over IT arcade left eye/Optos  fluorescein angiography. If uveitis is quiescent and stable, will discuss tapering methotrexate.       Attending Physician Attestation:  Complete documentation of historical and exam elements from today's encounter can be found in the full encounter summary report (not reduplicated in this progress note).  I personally obtained the chief complaint(s) and history of present illness.  I confirmed and edited as necessary the review of systems, past medical/surgical history, family history, social history, and examination findings as documented by others; and I examined the patient myself.  I personally reviewed the relevant tests, images, and reports as documented above.  I formulated and edited as necessary the assessment and plan and discussed the findings and management plan with the patient and family.  - Shannon Dominguez M.D.

## 2020-03-02 ENCOUNTER — HEALTH MAINTENANCE LETTER (OUTPATIENT)
Age: 35
End: 2020-03-02

## 2020-06-19 ENCOUNTER — OFFICE VISIT (OUTPATIENT)
Dept: OPHTHALMOLOGY | Facility: CLINIC | Age: 35
End: 2020-06-19
Attending: OPHTHALMOLOGY
Payer: COMMERCIAL

## 2020-06-19 DIAGNOSIS — H35.352 CYSTOID MACULAR DEGENERATION OF RETINA, LEFT: ICD-10-CM

## 2020-06-19 DIAGNOSIS — H35.372 EPIRETINAL MEMBRANE (ERM) OF LEFT EYE: ICD-10-CM

## 2020-06-19 DIAGNOSIS — H30.23 INTERMEDIATE UVEITIS OF BOTH EYES: Primary | ICD-10-CM

## 2020-06-19 PROCEDURE — G0463 HOSPITAL OUTPT CLINIC VISIT: HCPCS | Mod: ZF

## 2020-06-19 PROCEDURE — 92235 FLUORESCEIN ANGRPH MLTIFRAME: CPT | Mod: ZF | Performed by: OPHTHALMOLOGY

## 2020-06-19 PROCEDURE — 92134 CPTRZ OPH DX IMG PST SGM RTA: CPT | Mod: ZF | Performed by: OPHTHALMOLOGY

## 2020-06-19 ASSESSMENT — CONF VISUAL FIELD
METHOD: COUNTING FINGERS
OD_NORMAL: 1
OS_NORMAL: 1

## 2020-06-19 ASSESSMENT — EXTERNAL EXAM - RIGHT EYE: OD_EXAM: NORMAL

## 2020-06-19 ASSESSMENT — VISUAL ACUITY
OS_SC: 20/40
OS_PH_SC: 20/25
OS_SC+: +2
OD_SC: 20/20
OS_PH_SC+: -3
METHOD: SNELLEN - LINEAR

## 2020-06-19 ASSESSMENT — TONOMETRY
OD_IOP_MMHG: 18
IOP_METHOD: TONOPEN
OS_IOP_MMHG: 19

## 2020-06-19 ASSESSMENT — REFRACTION_WEARINGRX
OS_CYLINDER: +2.00
OD_CYLINDER: +0.75
OS_SPHERE: -1.50
OS_AXIS: 078
SPECS_TYPE: SVL
OD_AXIS: 094
OD_SPHERE: -0.25

## 2020-06-19 ASSESSMENT — CUP TO DISC RATIO
OD_RATIO: 0.1
OS_RATIO: 0.1

## 2020-06-19 ASSESSMENT — EXTERNAL EXAM - LEFT EYE: OS_EXAM: NORMAL

## 2020-06-19 ASSESSMENT — SLIT LAMP EXAM - LIDS
COMMENTS: NORMAL
COMMENTS: NORMAL

## 2020-06-19 NOTE — NURSING NOTE
Chief Complaints and History of Present Illnesses   Patient presents with     Uveitis Follow-Up     Intermediate uveitis     Chief Complaint(s) and History of Present Illness(es)     Uveitis Follow-Up     Laterality: both eyes    Course: stable    Associated symptoms: Negative for dryness, eye pain, photophobia, redness and tearing    Pain scale: 0/10    Comments: Intermediate uveitis              Comments     She states that her vision has seemed stable in both eyes since her last eye exam.  Patient denies having any eye discomfort.     MIHAELA Flores 9:47 AM  June 19, 2020

## 2020-06-19 NOTE — LETTER
6/19/2020      RE: Suma Schulz  [address] [facility] [address]       HPI: Suma Schulz is a 35 year old  female here for uveitis follow up. No eye pain and vision is good both eyes.    Ocular medications: Humira q2wk (started 4.7.18), oral methotrexate 25 mg/wk (started 10.16.17, increased to 25 mg/wk 11.13.17) + folic acid. IMT managed by Dr. Ruchi Martin ([facility]).  Note: Previously flared on 25 mg/wk MTX (at max ocular dose 3 months) and 2.5 mg/day oral prednisone.    Prior ocular medications: oral prednisone (started at 60 mg/day 9.16.17 and tapered, off since 6.15.18).    Ocular history:   1. 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  2. Retinoschisis cavities left eye. S/p laser barricade left eye 8.31.17 ([place]).  3. IIH diagnosed in 2015 but sympoms started in 2012, treated with 4 months diamox (had side effects: joint swelling and pain). Symptoms resolved near the end of pregnancy.  4. History of red left eye about 10 years ago, treated with a short course of topical corticosteroids, patient unsure of diagnosis.     Medical history:  1. 2 uncomplicated pregnancies (vaginal delivery), although blood pressure was slightly elevated at the end of more recent pregnancy.  2. Not currently pregnant or breastfeeding.  3. Mirena IUD - not planning pregnancy soon.  4. Negative for diabetes mellitus.  5. Scalp cyst.    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     Laboratory/Imaging Results:  MRI brain/orbits 9.14.17: unremarkable.  Labs 8.31.17: Quantiferon, RPR, Treponema pallidum ab, Lyme, HIV, Toxoplasma IgM/IgG, Bartonella henselae IgM/IgG, anti-Anaplsma phagocytophilum (HGA) IgM/IgG, urinary beta-2 microglobulin, anti-MPO, anti-pro3, ACE, ESR, CRP, and HLA-B27 all normal/negative. HSV1 IgG positive, HSV2 IgG negative. Complete blood count  (CBC) with diff normal. Complete metabolic panel (CMP) unremarkable except for slightly elevated glucose (116 but non-fasting).    Ocular Imagin20 mac OCT:  Right eye: within normal limits, stable vs 19  Left eye: mild epiretinal membrane, no IRF/SRF, stable intraretinal thickness    Optos fluorescein angiography 20, stable vs 19:  Right eye: essentially within normal limits, no optic nerve head or large vessel leakage  Left eye: essentially complete resolution of optic nerve head/macular/large vessel/schisis cavity leakage seen in 18 fluorescein angiography, illdefined hyperF at vitreous condensation IT arcade    Impression/Plan:  1. 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 Quiet today on 25 mg/wk methotrexate + Humira q2wk.    - Discussed long-term management with patient. Her eyes have been quiescent for nearly 2 years (clock starts 10.5.18) on current treatment and off prednisone, and testing left eye today is stable, so I think it is reasonable to start tapering medications.   - Continue Humira q2wk with Dr. Martin   - May start oral methotrexate taper: decrease to 20 mg/wk for 6 weeks then 17.5 mg/wk until next eye visit    2. Bullous retinoschisis left eye, s/p barricade laser. Does not extend posterior to laser.   - Monitor     3. Cystoid macular edema left eye, likely secondary to #1. Not present today.   - See management of #1    4. Epiretinal membrane left eye, not visually significant.   - Stable, Monitor    Return to uveitis clinic in 3 and 6 months: V/T/D/mac OCT.   Likely fluorescein angiography at 6 month visit.    Lexi Ricketts MD  Ophthalmology Resident, PGY-3    Attending Physician Attestation:  Complete documentation of historical and exam elements from today's encounter can be found in the full encounter summary report (not reduplicated in this progress note).  I personally obtained the chief complaint(s) and history of present  illness.  I confirmed and edited as necessary the review of systems, past medical/surgical history, family history, social history, and examination findings as documented by others; and I examined the patient myself.  I personally reviewed the relevant tests, images, and reports as documented above. I personally reviewed the ophthalmic test(s) associated with this encounter, agree with the interpretation(s) as documented by the resident/fellow, and have edited the corresponding report(s) as necessary. I formulated and edited as necessary the assessment and plan and discussed the findings and management plan with the patient and family.  - Shannon Dominguez M.D.  Sincerely,      Shannon Dominguez MD

## 2020-06-19 NOTE — PROGRESS NOTES
HPI: Suma Schulz is a 35 year old  female here for uveitis follow up. No eye pain and vision is good both eyes.    Ocular medications: Humira q2wk (started 4.7.18), oral methotrexate 25 mg/wk (started 10.16.17, increased to 25 mg/wk 11.13.17) + folic acid. IMT managed by Dr. Ruchi Martin ([facility]).  Note: Previously flared on 25 mg/wk MTX (at max ocular dose 3 months) and 2.5 mg/day oral prednisone.    Prior ocular medications: oral prednisone (started at 60 mg/day 9.16.17 and tapered, off since 6.15.18).    Ocular history:   1. 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  2. Retinoschisis cavities left eye. S/p laser barricade left eye 8.31.17 ([place]).  3. IIH diagnosed in 2015 but sympoms started in 2012, treated with 4 months diamox (had side effects: joint swelling and pain). Symptoms resolved near the end of pregnancy.  4. History of red left eye about 10 years ago, treated with a short course of topical corticosteroids, patient unsure of diagnosis.     Medical history:  1. 2 uncomplicated pregnancies (vaginal delivery), although blood pressure was slightly elevated at the end of more recent pregnancy.  2. Not currently pregnant or breastfeeding.  3. Mirena IUD - not planning pregnancy soon.  4. Negative for diabetes mellitus.  5. Scalp cyst.    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     Laboratory/Imaging Results:  MRI brain/orbits 9.14.17: unremarkable.  Labs 8.31.17: Quantiferon, RPR, Treponema pallidum ab, Lyme, HIV, Toxoplasma IgM/IgG, Bartonella henselae IgM/IgG, anti-Anaplsma phagocytophilum (HGA) IgM/IgG, urinary beta-2 microglobulin, anti-MPO, anti-pro3, ACE, ESR, CRP, and HLA-B27 all normal/negative. HSV1 IgG positive, HSV2 IgG negative. Complete blood count (CBC) with diff normal. Complete metabolic panel (CMP) unremarkable except for  slightly elevated glucose (116 but non-fasting).    Ocular Imagin20 mac OCT:  Right eye: within normal limits, stable vs 19  Left eye: mild epiretinal membrane, no IRF/SRF, stable intraretinal thickness    Optos fluorescein angiography 20, stable vs 19:  Right eye: essentially within normal limits, no optic nerve head or large vessel leakage  Left eye: essentially complete resolution of optic nerve head/macular/large vessel/schisis cavity leakage seen in 18 fluorescein angiography, illdefined hyperF at vitreous condensation IT arcade    Impression/Plan:  1. 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 Quiet today on 25 mg/wk methotrexate + Humira q2wk.    - Discussed long-term management with patient. Her eyes have been quiescent for nearly 2 years (clock starts 10.5.18) on current treatment and off prednisone, and testing left eye today is stable, so I think it is reasonable to start tapering medications.   - Continue Humira q2wk with Dr. Martin   - May start oral methotrexate taper: decrease to 20 mg/wk for 6 weeks then 17.5 mg/wk until next eye visit    2. Bullous retinoschisis left eye, s/p barricade laser. Does not extend posterior to laser.   - Monitor     3. Cystoid macular edema left eye, likely secondary to #1. Not present today.   - See management of #1    4. Epiretinal membrane left eye, not visually significant.   - Stable, Monitor    Return to uveitis clinic in 3 and 6 months: V/T/D/mac OCT.   Likely fluorescein angiography at 6 month visit.    Lexi Ricketts MD  Ophthalmology Resident, PGY-3    Attending Physician Attestation:  Complete documentation of historical and exam elements from today's encounter can be found in the full encounter summary report (not reduplicated in this progress note).  I personally obtained the chief complaint(s) and history of present illness.  I confirmed and edited as necessary the review of systems, past  medical/surgical history, family history, social history, and examination findings as documented by others; and I examined the patient myself.  I personally reviewed the relevant tests, images, and reports as documented above. I personally reviewed the ophthalmic test(s) associated with this encounter, agree with the interpretation(s) as documented by the resident/fellow, and have edited the corresponding report(s) as necessary. I formulated and edited as necessary the assessment and plan and discussed the findings and management plan with the patient and family.  - Shannon Dominguez M.D.

## 2020-06-19 NOTE — PATIENT INSTRUCTIONS
Continue Humira every 2 weeks  Continue folic acid  Decrease oral methotrexate to 8 pills/week for 6 weeks then 6 pills/week

## 2020-09-18 ENCOUNTER — OFFICE VISIT (OUTPATIENT)
Dept: OPHTHALMOLOGY | Facility: CLINIC | Age: 35
End: 2020-09-18
Attending: OPHTHALMOLOGY
Payer: COMMERCIAL

## 2020-09-18 DIAGNOSIS — H30.23 INTERMEDIATE UVEITIS OF BOTH EYES: Primary | ICD-10-CM

## 2020-09-18 DIAGNOSIS — H35.372 EPIRETINAL MEMBRANE (ERM) OF LEFT EYE: ICD-10-CM

## 2020-09-18 DIAGNOSIS — H35.352 CYSTOID MACULAR DEGENERATION OF RETINA, LEFT: ICD-10-CM

## 2020-09-18 PROBLEM — Z87.410 HISTORY OF CERVICAL DYSPLASIA: Status: ACTIVE | Noted: 2020-09-18

## 2020-09-18 PROCEDURE — 92134 CPTRZ OPH DX IMG PST SGM RTA: CPT | Mod: ZF | Performed by: OPHTHALMOLOGY

## 2020-09-18 PROCEDURE — G0463 HOSPITAL OUTPT CLINIC VISIT: HCPCS | Mod: ZF

## 2020-09-18 ASSESSMENT — EXTERNAL EXAM - RIGHT EYE: OD_EXAM: NORMAL

## 2020-09-18 ASSESSMENT — SLIT LAMP EXAM - LIDS
COMMENTS: NORMAL
COMMENTS: NORMAL

## 2020-09-18 ASSESSMENT — VISUAL ACUITY
OS_CC: 20/20
METHOD: SNELLEN - LINEAR
OD_CC: 20/20

## 2020-09-18 ASSESSMENT — CUP TO DISC RATIO
OD_RATIO: 0.1
OS_RATIO: 0.1

## 2020-09-18 ASSESSMENT — TONOMETRY
OD_IOP_MMHG: 15
OS_IOP_MMHG: 15
IOP_METHOD: TONOPEN

## 2020-09-18 ASSESSMENT — CONF VISUAL FIELD: OS_NORMAL: 1

## 2020-09-18 ASSESSMENT — EXTERNAL EXAM - LEFT EYE: OS_EXAM: NORMAL

## 2020-09-18 NOTE — LETTER
9/18/2020      RE: Suma Schulz  [address] [facility] [address]       HPI: Suma Schulz is a 35 year old  female here for uveitis follow up. She has no eye concerns today. She reports that her liver enzymes have been slightly elevated recently.    Ocular medications: Humira q2wk (started 4.7.18), oral methotrexate 15 mg/wk (started 10.16.17, increased to 25 mg/wk 11.13.17, started tapering June 2020) + folic acid. IMT managed by Dr. Ruchi Martin ([facility]).  Note: Previously flared on 25 mg/wk MTX (at max ocular dose 3 months) and 2.5 mg/day oral prednisone.    Prior ocular medications: oral prednisone (started at 60 mg/day 9.16.17 and tapered, off since 6.15.18).    Ocular history:   1. 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  2. Retinoschisis cavities left eye. S/p laser barricade left eye 8.31.17 ([place]).  3. IIH diagnosed in 2015 but sympoms started in 2012, treated with 4 months diamox (had side effects: joint swelling and pain). Symptoms resolved near the end of pregnancy.  4. History of red left eye about 10 years ago, treated with a short course of topical corticosteroids, patient unsure of diagnosis.     Medical history:  1. 2 uncomplicated pregnancies (vaginal delivery), although blood pressure was slightly elevated at the end of more recent pregnancy.  2. Not currently pregnant or breastfeeding.  3. Mirena IUD - not planning pregnancy soon.  4. Negative for diabetes mellitus.  5. Scalp cyst.    Social history: [employer] . Never smoked. Alcohol use: ~one beverage monthly.         Laboratory/Imaging Results:  MRI brain/orbits 9.14.17: unremarkable.  Labs 8.31.17: Quantiferon, RPR, Treponema pallidum ab, Lyme, HIV, Toxoplasma IgM/IgG, Bartonella henselae IgM/IgG, anti-Anaplsma phagocytophilum (HGA) IgM/IgG, urinary beta-2 microglobulin, anti-MPO, anti-pro3, ACE, ESR, CRP,  and HLA-B27 all normal/negative. HSV1 IgG positive, HSV2 IgG negative. Complete blood count (CBC) with diff normal. Complete metabolic panel (CMP) unremarkable except for slightly elevated glucose (116 but non-fasting).    Ocular Imagin20 mac OCT:  Right eye: within normal limits, stable vs 20  Left eye: mild epiretinal membrane, no IRF/SRF, stable intraretinal thickness    Optos fluorescein angiography 20, stable vs 19:  Right eye: essentially within normal limits, no optic nerve head or large vessel leakage  Left eye: essentially complete resolution of optic nerve head/macular/large vessel/schisis cavity leakage seen in 18 fluorescein angiography, illdefined hyperF at vitreous condensation IT arcade    Impression/Plan:  1. 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 Quiet today on 15 mg/wk methotrexate (slowly tapering) + Humira q2wk.    - Discussed long-term management with patient. Her eyes have been quiescent for nearly 2 years (clock starts 10.5.18) on current treatment and off prednisone, so I think it is reasonable to taper off methotrexate: decrease to 10 mg/wk for 1 month then stop   - Continue Humira q2wk with Dr. Martin    2. Bullous retinoschisis left eye, s/p barricade laser. Does not extend posterior to laser.   - Monitor     3. Cystoid macular edema left eye, likely secondary to #1. Not present today.   - See management of #1    4. Epiretinal membrane left eye, not visually significant.   - Stable, Monitor    Return to uveitis clinic in 3 months: V/T/D/mac OCT/Optos fluorescein angiography transit left eye.     Attending Physician Attestation:  Complete documentation of historical and exam elements from today's encounter can be found in the full encounter summary report (not reduplicated in this progress note).  I personally obtained the chief complaint(s) and history of present illness.  I confirmed and edited as necessary the review  of systems, past medical/surgical history, family history, social history, and examination findings as documented by others; and I examined the patient myself.  I personally reviewed the relevant tests, images, and reports as documented above. I formulated and edited as necessary the assessment and plan and discussed the findings and management plan with the patient and family.  - Shannon Dominguez M.D.    Sincerely,    Shannon Dominguez MD

## 2020-09-18 NOTE — PROGRESS NOTES
HPI: Suma Schulz is a 35 year old  female here for uveitis follow up. She has no eye concerns today. She reports that her liver enzymes have been slightly elevated recently.    Ocular medications: Humira q2wk (started 4.7.18), oral methotrexate 15 mg/wk (started 10.16.17, increased to 25 mg/wk 11.13.17, started tapering June 2020) + folic acid. IMT managed by Dr. Ruchi Martin ([facility]).  Note: Previously flared on 25 mg/wk MTX (at max ocular dose 3 months) and 2.5 mg/day oral prednisone.    Prior ocular medications: oral prednisone (started at 60 mg/day 9.16.17 and tapered, off since 6.15.18).    Ocular history:   1. 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  2. Retinoschisis cavities left eye. S/p laser barricade left eye 8.31.17 ([place]).  3. IIH diagnosed in 2015 but sympoms started in 2012, treated with 4 months diamox (had side effects: joint swelling and pain). Symptoms resolved near the end of pregnancy.  4. History of red left eye about 10 years ago, treated with a short course of topical corticosteroids, patient unsure of diagnosis.     Medical history:  1. 2 uncomplicated pregnancies (vaginal delivery), although blood pressure was slightly elevated at the end of more recent pregnancy.  2. Not currently pregnant or breastfeeding.  3. Mirena IUD - not planning pregnancy soon.  4. Negative for diabetes mellitus.  5. Scalp cyst.    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     Laboratory/Imaging Results:  MRI brain/orbits 9.14.17: unremarkable.  Labs 8.31.17: Quantiferon, RPR, Treponema pallidum ab, Lyme, HIV, Toxoplasma IgM/IgG, Bartonella henselae IgM/IgG, anti-Anaplsma phagocytophilum (HGA) IgM/IgG, urinary beta-2 microglobulin, anti-MPO, anti-pro3, ACE, ESR, CRP, and HLA-B27 all normal/negative. HSV1 IgG positive, HSV2 IgG negative. Complete  blood count (CBC) with diff normal. Complete metabolic panel (CMP) unremarkable except for slightly elevated glucose (116 but non-fasting).    Ocular Imagin20 mac OCT:  Right eye: within normal limits, stable vs 20  Left eye: mild epiretinal membrane, no IRF/SRF, stable intraretinal thickness    Optos fluorescein angiography 20, stable vs 19:  Right eye: essentially within normal limits, no optic nerve head or large vessel leakage  Left eye: essentially complete resolution of optic nerve head/macular/large vessel/schisis cavity leakage seen in 18 fluorescein angiography, illdefined hyperF at vitreous condensation IT arcade    Impression/Plan:  1. 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 Quiet today on 15 mg/wk methotrexate (slowly tapering) + Humira q2wk.    - Discussed long-term management with patient. Her eyes have been quiescent for nearly 2 years (clock starts 10.5.18) on current treatment and off prednisone, so I think it is reasonable to taper off methotrexate: decrease to 10 mg/wk for 1 month then stop   - Continue Humira q2wk with Dr. Martin    2. Bullous retinoschisis left eye, s/p barricade laser. Does not extend posterior to laser.   - Monitor     3. Cystoid macular edema left eye, likely secondary to #1. Not present today.   - See management of #1    4. Epiretinal membrane left eye, not visually significant.   - Stable, Monitor    Return to uveitis clinic in 3 months: V/T/D/mac OCT/Optos fluorescein angiography transit left eye.     Attending Physician Attestation:  Complete documentation of historical and exam elements from today's encounter can be found in the full encounter summary report (not reduplicated in this progress note).  I personally obtained the chief complaint(s) and history of present illness.  I confirmed and edited as necessary the review of systems, past medical/surgical history, family history, social history, and  examination findings as documented by others; and I examined the patient myself.  I personally reviewed the relevant tests, images, and reports as documented above. I formulated and edited as necessary the assessment and plan and discussed the findings and management plan with the patient and family.  - Shannon Dominguez M.D.

## 2020-12-14 ENCOUNTER — HEALTH MAINTENANCE LETTER (OUTPATIENT)
Age: 35
End: 2020-12-14

## 2020-12-18 ENCOUNTER — OFFICE VISIT (OUTPATIENT)
Dept: OPHTHALMOLOGY | Facility: CLINIC | Age: 35
End: 2020-12-18
Attending: OPHTHALMOLOGY
Payer: COMMERCIAL

## 2020-12-18 DIAGNOSIS — H30.23 INTERMEDIATE UVEITIS OF BOTH EYES: Primary | ICD-10-CM

## 2020-12-18 DIAGNOSIS — H35.372 EPIRETINAL MEMBRANE (ERM) OF LEFT EYE: ICD-10-CM

## 2020-12-18 DIAGNOSIS — H35.352 CYSTOID MACULAR DEGENERATION OF RETINA, LEFT: ICD-10-CM

## 2020-12-18 PROCEDURE — 92235 FLUORESCEIN ANGRPH MLTIFRAME: CPT | Performed by: OPHTHALMOLOGY

## 2020-12-18 PROCEDURE — 92134 CPTRZ OPH DX IMG PST SGM RTA: CPT | Performed by: OPHTHALMOLOGY

## 2020-12-18 PROCEDURE — G0463 HOSPITAL OUTPT CLINIC VISIT: HCPCS

## 2020-12-18 PROCEDURE — 99214 OFFICE O/P EST MOD 30 MIN: CPT | Performed by: OPHTHALMOLOGY

## 2020-12-18 ASSESSMENT — VISUAL ACUITY
METHOD: SNELLEN - LINEAR
OS_CC: 20/20
CORRECTION_TYPE: GLASSES
OD_CC: 20/20

## 2020-12-18 ASSESSMENT — SLIT LAMP EXAM - LIDS
COMMENTS: NORMAL
COMMENTS: NORMAL

## 2020-12-18 ASSESSMENT — EXTERNAL EXAM - LEFT EYE: OS_EXAM: NORMAL

## 2020-12-18 ASSESSMENT — REFRACTION_WEARINGRX
OD_AXIS: 094
OS_CYLINDER: +2.00
OD_CYLINDER: +0.75
SPECS_TYPE: SVL
OS_SPHERE: -1.50
OD_SPHERE: -0.25
OS_AXIS: 078

## 2020-12-18 ASSESSMENT — TONOMETRY
IOP_METHOD: TONOPEN
OS_IOP_MMHG: 16
OD_IOP_MMHG: 16

## 2020-12-18 ASSESSMENT — EXTERNAL EXAM - RIGHT EYE: OD_EXAM: NORMAL

## 2020-12-18 ASSESSMENT — CUP TO DISC RATIO
OS_RATIO: 0.1
OD_RATIO: 0.1

## 2020-12-18 ASSESSMENT — CONF VISUAL FIELD
OD_NORMAL: 1
OS_NORMAL: 1

## 2020-12-18 NOTE — NURSING NOTE
Chief Complaints and History of Present Illnesses   Patient presents with     Uveitis Follow-Up     Chief Complaint(s) and History of Present Illness(es)     Uveitis Follow-Up     Laterality: both eyes    Associated symptoms: Negative for pain with eye movement, eye pain, flashes and floaters    Treatments tried: no treatments    Pain scale: 0/10              Comments     3 month Uveitis  follow up   Doing good, no vision change since last visit  Viridiana CHIRINOS 12:11 PM December 18, 2020

## 2020-12-18 NOTE — LETTER
12/18/2020      RE: Suma Schulz  [address] [facility] [address]       HPI: Suma Schulz is a 35 year old  female here for uveitis follow up. She has no eye concerns today. She reports no changes with eyes since last visit. Floaters are the same. No flashing lights.    Ocular medications: Humira q2wk (started 4.7.18), IMT managed by Dr. Ruchi Martin ([facility]).  Note: Previously flared on 25 mg/wk MTX (at max ocular dose 3 months) and 2.5 mg/day oral prednisone.  Prior ocular medications: oral prednisone (started at 60 mg/day 9.16.17 and tapered, off since 6.15.18), oral methotrexate (started 10.16.17, increased to 25 mg/wk 11.13.17, started tapering June 2020 and stopped Oct 2020), folic acid.    Ocular history:   1. 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  2. Retinoschisis cavities left eye. S/p laser barricade left eye 8.31.17 ([place]).  3. IIH diagnosed in 2015 but sympoms started in 2012, treated with 4 months diamox (had side effects: joint swelling and pain). Symptoms resolved near the end of pregnancy.  4. History of red left eye about 10 years ago, treated with a short course of topical corticosteroids, patient unsure of diagnosis.     Medical history:  1. 2 uncomplicated pregnancies (vaginal delivery), although blood pressure was slightly elevated at the end of more recent pregnancy.  2. Not currently pregnant or breastfeeding.  3. Mirena IUD - not planning pregnancy soon.  4. Negative for diabetes mellitus.  5. Scalp cyst.    Social history: [employer] . Never smoked. Alcohol use: ~one beverage monthly.     Laboratory/Imaging Results:  MRI brain/orbits 9.14.17: unremarkable.  Labs 8.31.17: Quantiferon, RPR, Treponema pallidum ab, Lyme, HIV, Toxoplasma IgM/IgG, Bartonella henselae IgM/IgG, anti-Anaplsma phagocytophilum (HGA) IgM/IgG, urinary beta-2 microglobulin,  anti-MPO, anti-pro3, ACE, ESR, CRP, and HLA-B27 all normal/negative. HSV1 IgG positive, HSV2 IgG negative. Complete blood count (CBC) with diff normal. Complete metabolic panel (CMP) unremarkable except for slightly elevated glucose (116 but non-fasting).    Ocular Imagin20 mac OCT:  Right eye: within normal limits, retinal thickening slightly improved vs 20  Left eye: mild epiretinal membrane, no IRF/SRF, stable/slightly improved intraretinal thickness    Optos fluorescein angiography 20, stable vs 20:  Right eye: essentially within normal limits, no optic nerve head or large vessel leakage, late mild midperipheral small vessel leakage  Left eye: essentially complete resolution of optic nerve head/macular/large vessel/schisis cavity leakage seen in 18 fluorescein angiography    Impression/Plan:  1. 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 Quiet today on Humira q2wk; however there is new posterior synechiae left eye, so will monitor with nondilated visit in the next ~month.   - Single drop of 10% phenylephrine right eye in clinic today   - Continue Humira q2wk with Dr. Martin    2. Bullous retinoschisis left eye, s/p barricade laser. Does not extend posterior to laser.   - Monitor     3. Cystoid macular edema left eye, likely secondary to #1. Not present today.   - See management of #1    4. Epiretinal membrane left eye, not visually significant and stable.   - Monitor      Return to uveitis clinic in 4-6 weeks: V/T/mac OCT/RNFL OCT.    Attending Physician Attestation:  Complete documentation of historical and exam elements from today's encounter can be found in the full encounter summary report (not reduplicated in this progress note).  I personally obtained the chief complaint(s) and history of present illness.  I confirmed and edited as necessary the review of systems, past medical/surgical history, family history, social history, and  examination findings as documented by others; and I examined the patient myself.  I personally reviewed the relevant tests, images, and reports as documented above. I formulated and edited as necessary the assessment and plan and discussed the findings and management plan with the patient and family.  - Shannon Dominguez M.D.    Shannon Dominguez MD, PhD  Uveitis  Department of Ophthalmology & Visual Neurosciences  [facility]

## 2020-12-18 NOTE — PROGRESS NOTES
HPI: Suma Schulz is a 35 year old  female here for uveitis follow up. She has no eye concerns today. She reports no changes with eyes since last visit. Floaters are the same. No flashing lights.    Ocular medications: Humira q2wk (started 4.7.18), IMT managed by Dr. Ruchi Martin ([facility]).  Note: Previously flared on 25 mg/wk MTX (at max ocular dose 3 months) and 2.5 mg/day oral prednisone.  Prior ocular medications: oral prednisone (started at 60 mg/day 9.16.17 and tapered, off since 6.15.18), oral methotrexate (started 10.16.17, increased to 25 mg/wk 11.13.17, started tapering June 2020 and stopped Oct 2020), folic acid.    Ocular history:   1. 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  2. Retinoschisis cavities left eye. S/p laser barricade left eye 8.31.17 ([place]).  3. IIH diagnosed in 2015 but sympoms started in 2012, treated with 4 months diamox (had side effects: joint swelling and pain). Symptoms resolved near the end of pregnancy.  4. History of red left eye about 10 years ago, treated with a short course of topical corticosteroids, patient unsure of diagnosis.     Medical history:  1. 2 uncomplicated pregnancies (vaginal delivery), although blood pressure was slightly elevated at the end of more recent pregnancy.  2. Not currently pregnant or breastfeeding.  3. Mirena IUD - not planning pregnancy soon.  4. Negative for diabetes mellitus.  5. Scalp cyst.    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     Laboratory/Imaging Results:  MRI brain/orbits 9.14.17: unremarkable.  Labs 8.31.17: Quantiferon, RPR, Treponema pallidum ab, Lyme, HIV, Toxoplasma IgM/IgG, Bartonella henselae IgM/IgG, anti-Anaplsma phagocytophilum (HGA) IgM/IgG, urinary beta-2 microglobulin, anti-MPO, anti-pro3, ACE, ESR, CRP, and HLA-B27 all normal/negative. HSV1 IgG positive,  HSV2 IgG negative. Complete blood count (CBC) with diff normal. Complete metabolic panel (CMP) unremarkable except for slightly elevated glucose (116 but non-fasting).    Ocular Imagin20 mac OCT:  Right eye: within normal limits, retinal thickening slightly improved vs 20  Left eye: mild epiretinal membrane, no IRF/SRF, stable/slightly improved intraretinal thickness    Optos fluorescein angiography 20, stable vs 20:  Right eye: essentially within normal limits, no optic nerve head or large vessel leakage, late mild midperipheral small vessel leakage  Left eye: essentially complete resolution of optic nerve head/macular/large vessel/schisis cavity leakage seen in 18 fluorescein angiography    Impression/Plan:  1. 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 Quiet today on Humira q2wk; however there is new posterior synechiae left eye, so will monitor with nondilated visit in the next ~month.   - Single drop of 10% phenylephrine right eye in clinic today   - Continue Humira q2wk with Dr. Martin    2. Bullous retinoschisis left eye, s/p barricade laser. Does not extend posterior to laser.   - Monitor     3. Cystoid macular edema left eye, likely secondary to #1. Not present today.   - See management of #1    4. Epiretinal membrane left eye, not visually significant and stable.   - Monitor      Return to uveitis clinic in 4-6 weeks: V/T/mac OCT/RNFL OCT.    Attending Physician Attestation:  Complete documentation of historical and exam elements from today's encounter can be found in the full encounter summary report (not reduplicated in this progress note).  I personally obtained the chief complaint(s) and history of present illness.  I confirmed and edited as necessary the review of systems, past medical/surgical history, family history, social history, and examination findings as documented by others; and I examined the patient myself.  I personally  reviewed the relevant tests, images, and reports as documented above. I formulated and edited as necessary the assessment and plan and discussed the findings and management plan with the patient and family.  - Shannon Dominguez M.D.

## 2021-01-22 ENCOUNTER — OFFICE VISIT (OUTPATIENT)
Dept: OPHTHALMOLOGY | Facility: CLINIC | Age: 36
End: 2021-01-22
Attending: OPHTHALMOLOGY
Payer: COMMERCIAL

## 2021-01-22 DIAGNOSIS — H35.352 CYSTOID MACULAR DEGENERATION OF RETINA, LEFT: ICD-10-CM

## 2021-01-22 DIAGNOSIS — H30.23 INTERMEDIATE UVEITIS OF BOTH EYES: Primary | ICD-10-CM

## 2021-01-22 DIAGNOSIS — H35.372 EPIRETINAL MEMBRANE (ERM) OF LEFT EYE: ICD-10-CM

## 2021-01-22 PROCEDURE — G0463 HOSPITAL OUTPT CLINIC VISIT: HCPCS

## 2021-01-22 PROCEDURE — 99213 OFFICE O/P EST LOW 20 MIN: CPT | Performed by: OPHTHALMOLOGY

## 2021-01-22 PROCEDURE — 92134 CPTRZ OPH DX IMG PST SGM RTA: CPT | Performed by: OPHTHALMOLOGY

## 2021-01-22 ASSESSMENT — REFRACTION_WEARINGRX
OS_CYLINDER: +2.00
OD_SPHERE: -0.25
OD_AXIS: 094
SPECS_TYPE: SVL
OD_CYLINDER: +0.75
OS_SPHERE: -1.50
OS_AXIS: 078

## 2021-01-22 ASSESSMENT — VISUAL ACUITY
CORRECTION_TYPE: GLASSES
OS_CC: 20/20
OD_CC+: -1
OD_CC: 20/20
OS_CC+: -1
METHOD: SNELLEN - LINEAR

## 2021-01-22 ASSESSMENT — EXTERNAL EXAM - LEFT EYE: OS_EXAM: NORMAL

## 2021-01-22 ASSESSMENT — CONF VISUAL FIELD
OD_NORMAL: 1
OS_NORMAL: 1
METHOD: COUNTING FINGERS

## 2021-01-22 ASSESSMENT — TONOMETRY
IOP_METHOD: TONOPEN
OD_IOP_MMHG: 16
OS_IOP_MMHG: 15

## 2021-01-22 ASSESSMENT — CUP TO DISC RATIO
OD_RATIO: 0.1
OS_RATIO: 0.1

## 2021-01-22 ASSESSMENT — SLIT LAMP EXAM - LIDS
COMMENTS: NORMAL
COMMENTS: NORMAL

## 2021-01-22 ASSESSMENT — EXTERNAL EXAM - RIGHT EYE: OD_EXAM: NORMAL

## 2021-01-22 NOTE — NURSING NOTE
Chief Complaints and History of Present Illnesses   Patient presents with     Uveitis Follow-Up     Chief Complaint(s) and History of Present Illness(es)     Uveitis Follow-Up     Laterality: both eyes    Course: stable    Associated symptoms: Negative for eye pain, dryness, redness and photophobia    Treatments tried: no treatments    Pain scale: 0/10              Comments     She states that her vision has seemed stable in both eyes since her last eye exam.   Patient denies having any eye discomfort.    She is using Humira once every two weeks.    Sada Chung, COT 12:40 PM  January 22, 2021

## 2021-01-22 NOTE — PROGRESS NOTES
HPI: Suma Schulz is a 35 year old  female here for uveitis follow up. She has no eye concerns today. Again, she reports no changes with eyes since last visit. Floaters are the same. No flashing lights. No eye redness, pain, or photophobia.    Ocular medications: Humira q2wk (started 4.7.18), IMT managed by Dr. Ruchi Martin ([facility]).  Note: Previously flared on 25 mg/wk MTX (at max ocular dose 3 months) and 2.5 mg/day oral prednisone.  Prior ocular medications: oral prednisone (started at 60 mg/day 9.16.17 and tapered, off since 6.15.18), oral methotrexate (started 10.16.17, increased to 25 mg/wk 11.13.17, started tapering June 2020 and stopped Oct 2020), folic acid.    Ocular history:   1. 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  2. Posterior synechiae left eye.   3. Retinoschisis cavities left eye. S/p laser barricade left eye 8.31.17 ([place]).  4. IIH diagnosed in 2015 but sympoms started in 2012, treated with 4 months diamox (had side effects: joint swelling and pain). Symptoms resolved near the end of pregnancy.  5. History of red left eye about 10 years ago, treated with a short course of topical corticosteroids, patient unsure of diagnosis.     Medical history:  1. 2 uncomplicated pregnancies (vaginal delivery), although blood pressure was slightly elevated at the end of more recent pregnancy.  2. Not currently pregnant or breastfeeding.  3. Mirena IUD - not planning pregnancy soon.  4. Negative for diabetes mellitus.  5. Scalp cyst.    [facility] history: [employer] . Never smoked. Alcohol use: ~one beverage monthly.     Laboratory/Imaging Results:  MRI brain/orbits 9.14.17: unremarkable.  Labs 8.31.17: Quantiferon, RPR, Treponema pallidum ab, Lyme, HIV, Toxoplasma IgM/IgG, Bartonella henselae IgM/IgG, anti-Anaplsma phagocytophilum (HGA) IgM/IgG, urinary beta-2 microglobulin,  anti-MPO, anti-pro3, ACE, ESR, CRP, and HLA-B27 all normal/negative. HSV1 IgG positive, HSV2 IgG negative. Complete blood count (CBC) with diff normal. Complete metabolic panel (CMP) unremarkable except for slightly elevated glucose (116 but non-fasting).    Ocular Imagin21 mac OCT:  Right eye: within normal limits, retinal thickening stable vs 20  Left eye: mild epiretinal membrane, no IRF/SRF, stable retinal thickness    Optos fluorescein angiography 20, stable vs 20:  Right eye: essentially within normal limits, no optic nerve head or large vessel leakage, late mild midperipheral small vessel leakage  Left eye: essentially complete resolution of optic nerve head/macular/large vessel/schisis cavity leakage seen in 18 fluorescein angiography    Impression/Plan:  1. 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 This is a non-dilated exam to monitor for anterior uveitis given new posterior synechiae left eye seen last visit. No active anterior or intermediate uveitis on examination today.   - Continue Humira q2wk with Dr. Martin    2. Bullous retinoschisis left eye, s/p barricade laser. Does not extend posterior to laser.   - Monitor     3. History of cystoid macular edema left eye, likely secondary to #1. No recurrence today.   - See management of #1    4. Epiretinal membrane left eye, not visually significant and stable.   - Monitor      Return to uveitis clinic in 3 months: V/T/D/mac OCT/RNFL OCT.  Plan fluorescein angiography for following visit.    Attending Physician Attestation:  Complete documentation of historical and exam elements from today's encounter can be found in the full encounter summary report (not reduplicated in this progress note).  I personally obtained the chief complaint(s) and history of present illness.  I confirmed and edited as necessary the review of systems, past medical/surgical history, family history, social history,  and examination findings as documented by others; and I examined the patient myself.  I personally reviewed the relevant tests, images, and reports as documented above. I formulated and edited as necessary the assessment and plan and discussed the findings and management plan with the patient and family.  - Shannon Dominguez M.D.

## 2021-01-22 NOTE — LETTER
1/22/2021      RE: Suma Schulz  [address] [facility] [id number]       HPI: Suma Schulz is a 35 year old  female here for uveitis follow up. She has no eye concerns today. Again, she reports no changes with eyes since last visit. Floaters are the same. No flashing lights. No eye redness, pain, or photophobia.    Ocular medications: Humira q2wk (started 4.7.18), IMT managed by Dr. Ruchi Martin ([facility]).  Note: Previously flared on 25 mg/wk MTX (at max ocular dose 3 months) and 2.5 mg/day oral prednisone.  Prior ocular medications: oral prednisone (started at 60 mg/day 9.16.17 and tapered, off since 6.15.18), oral methotrexate (started 10.16.17, increased to 25 mg/wk 11.13.17, started tapering June 2020 and stopped Oct 2020), folic acid.    Ocular history:   1. 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  2. Posterior synechiae left eye.   3. Retinoschisis cavities left eye. S/p laser barricade left eye 8.31.17 ([place]).  4. IIH diagnosed in 2015 but sympoms started in 2012, treated with 4 months diamox (had side effects: joint swelling and pain). Symptoms resolved near the end of pregnancy.  5. History of red left eye about 10 years ago, treated with a short course of topical corticosteroids, patient unsure of diagnosis.     Medical history:  1. Two uncomplicated pregnancies (vaginal delivery), although blood pressure was slightly elevated at the end of more recent pregnancy.  2. Not currently pregnant or breastfeeding.  3. Mirena IUD - not planning pregnancy soon.  4. Negative for diabetes mellitus.  5. Scalp cyst.    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   Laboratory/Imaging Results:  MRI brain/orbits 9.14.17: unremarkable.  Labs 8.31.17: Quantiferon, RPR, Treponema pallidum ab, Lyme, HIV, Toxoplasma IgM/IgG, Bartonella henselae IgM/IgG,  anti-Anaplsma phagocytophilum (HGA) IgM/IgG, urinary beta-2 microglobulin, anti-MPO, anti-pro3, ACE, ESR, CRP, and HLA-B27 all normal/negative. HSV1 IgG positive, HSV2 IgG negative. Complete blood count (CBC) with diff normal. Complete metabolic panel (CMP) unremarkable except for slightly elevated glucose (116 but non-fasting).    Ocular Imagin21 mac OCT:  Right eye: within normal limits, retinal thickening stable vs 20  Left eye: mild epiretinal membrane, no IRF/SRF, stable retinal thickness    Optos fluorescein angiography 20, stable vs 20:  Right eye: essentially within normal limits, no optic nerve head or large vessel leakage, late mild midperipheral small vessel leakage  Left eye: essentially complete resolution of optic nerve head/macular/large vessel/schisis cavity leakage seen in 18 fluorescein angiography    Impression/Plan:  1. 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 This is a non-dilated exam to monitor for anterior uveitis given new posterior synechiae left eye seen last visit. No active anterior or intermediate uveitis on examination today.   - Continue Humira q2wk with Dr. Martin    2. Bullous retinoschisis left eye, s/p barricade laser. Does not extend posterior to laser.   - Monitor     3. History of cystoid macular edema left eye, likely secondary to #1. No recurrence today.   - See management of #1    4. Epiretinal membrane left eye, not visually significant and stable.   - Monitor    Return to uveitis clinic in 3 months: V/T/D/mac OCT/RNFL OCT.  Plan fluorescein angiography for following visit.    Attending Physician Attestation:  Complete documentation of historical and exam elements from today's encounter can be found in the full encounter summary report (not reduplicated in this progress note).  I personally obtained the chief complaint(s) and history of present illness.  I confirmed and edited as necessary the review of  systems, past medical/surgical history, family history, social history, and examination findings as documented by others; and I examined the patient myself.  I personally reviewed the relevant tests, images, and reports as documented above. I formulated and edited as necessary the assessment and plan and discussed the findings and management plan with the patient and family.  - Shannon Dominguez M.D.    Sincerely,    Shannon Dominguez MD

## 2021-04-18 ENCOUNTER — HEALTH MAINTENANCE LETTER (OUTPATIENT)
Age: 36
End: 2021-04-18

## 2021-04-23 ENCOUNTER — OFFICE VISIT (OUTPATIENT)
Dept: OPHTHALMOLOGY | Facility: CLINIC | Age: 36
End: 2021-04-23
Attending: OPHTHALMOLOGY
Payer: COMMERCIAL

## 2021-04-23 DIAGNOSIS — H35.372 EPIRETINAL MEMBRANE (ERM) OF LEFT EYE: ICD-10-CM

## 2021-04-23 DIAGNOSIS — H35.352 CYSTOID MACULAR DEGENERATION OF RETINA, LEFT: ICD-10-CM

## 2021-04-23 DIAGNOSIS — H30.23 INTERMEDIATE UVEITIS OF BOTH EYES: Primary | ICD-10-CM

## 2021-04-23 PROCEDURE — G0463 HOSPITAL OUTPT CLINIC VISIT: HCPCS

## 2021-04-23 PROCEDURE — 92134 CPTRZ OPH DX IMG PST SGM RTA: CPT | Performed by: OPHTHALMOLOGY

## 2021-04-23 PROCEDURE — 99213 OFFICE O/P EST LOW 20 MIN: CPT | Performed by: OPHTHALMOLOGY

## 2021-04-23 ASSESSMENT — CUP TO DISC RATIO
OS_RATIO: 0.1
OD_RATIO: 0.1

## 2021-04-23 ASSESSMENT — CONF VISUAL FIELD
METHOD: COUNTING FINGERS
OD_NORMAL: 1
OS_NORMAL: 1

## 2021-04-23 ASSESSMENT — REFRACTION_WEARINGRX
OS_SPHERE: -1.50
SPECS_TYPE: SVL
OD_CYLINDER: +0.75
OD_AXIS: 094
OS_CYLINDER: +2.00
OD_SPHERE: -0.25
OS_AXIS: 078

## 2021-04-23 ASSESSMENT — SLIT LAMP EXAM - LIDS
COMMENTS: NORMAL
COMMENTS: NORMAL

## 2021-04-23 ASSESSMENT — VISUAL ACUITY
METHOD: SNELLEN - LINEAR
OD_CC: 20/20
CORRECTION_TYPE: GLASSES
OS_CC: 20/20
OD_CC+: +2

## 2021-04-23 ASSESSMENT — TONOMETRY
OS_IOP_MMHG: 14
IOP_METHOD: TONOPEN
OD_IOP_MMHG: 16

## 2021-04-23 ASSESSMENT — EXTERNAL EXAM - LEFT EYE: OS_EXAM: NORMAL

## 2021-04-23 ASSESSMENT — EXTERNAL EXAM - RIGHT EYE: OD_EXAM: NORMAL

## 2021-04-23 NOTE — NURSING NOTE
"Chief Complaints and History of Present Illnesses   Patient presents with     Uveitis Follow-Up     3 month follow up both eyes.     Chief Complaint(s) and History of Present Illness(es)     Uveitis Follow-Up     Comments: 3 month follow up both eyes.              Comments     Pt states vision has been good since last visit. No eye pain today.   Pt notes seeing a \"black\" floater in LE on Tuesday, but pt not seeing it today.  No flashes of light.    LAMONT Dorsey April 23, 2021 11:56 AM                    "

## 2021-04-23 NOTE — LETTER
4/23/2021      RE: Suma Schulz  [address] [facility] [address]       HPI: Suma Schulz is a 36 year old  female here for uveitis follow up. She had a new black floater left eye for only one day last week, then gone. No flashing lights. No eye redness, pain, or photophobia.    Ocular medications: Humira q2wk (started 4.7.18), IMT managed by Dr. Ruchi Martin ([facility]).  Note: Previously flared on 25 mg/wk MTX (at max ocular dose 3 months) and 2.5 mg/day oral prednisone.  Prior ocular medications: oral prednisone (started at 60 mg/day 9.16.17 and tapered, off since 6.15.18), oral methotrexate (started 10.16.17, increased to 25 mg/wk 11.13.17, started tapering June 2020 and stopped Oct 2020), folic acid.    Ocular history:   1. 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  2. Posterior synechiae left eye.   3. Retinoschisis cavities left eye. S/p laser barricade left eye 8.31.17 ([place]).  4. IIH diagnosed in 2015 but sympoms started in 2012, treated with 4 months diamox (had side effects: joint swelling and pain). Symptoms resolved near the end of pregnancy.  5. History of red left eye about 10 years ago, treated with a short course of topical corticosteroids, patient unsure of diagnosis.     Medical history:  1. 2 uncomplicated pregnancies (vaginal delivery), although blood pressure was slightly elevated at the end of more recent pregnancy.  2. Not currently pregnant or breastfeeding.  3. Mirena IUD - not planning pregnancy soon.  4. Negative for diabetes mellitus.  5. Scalp cyst.    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     Laboratory/Imaging Results:  MRI brain/orbits 9.14.17: unremarkable.  Labs 8.31.17: Quantiferon, RPR, Treponema pallidum ab, Lyme, HIV, Toxoplasma IgM/IgG, Bartonella henselae IgM/IgG, anti-Anaplsma phagocytophilum (HGA)  IgM/IgG, urinary beta-2 microglobulin, anti-MPO, anti-pro3, ACE, ESR, CRP, and HLA-B27 all normal/negative. HSV1 IgG positive, HSV2 IgG negative. Complete blood count (CBC) with diff normal. Complete metabolic panel (CMP) unremarkable except for slightly elevated glucose (116 but non-fasting).    Ocular Imagin21 mac OCT:  Right eye: within normal limits, retinal thickening stable  Left eye: mild epiretinal membrane, no IRF/SRF, stable retinal thickness    Optos fluorescein angiography 20, stable vs 20:  Right eye: essentially within normal limits, no optic nerve head or large vessel leakage, late mild midperipheral small vessel leakage  Left eye: essentially complete resolution of optic nerve head/macular/large vessel/schisis cavity leakage seen in 18 fluorescein angiography    Impression/Plan:  1. 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 Inactive today.   - Continue Humira q2wk with Dr. Martin    2. Bullous retinoschisis left eye, s/p barricade laser. Does not extend posterior to laser.   - Monitor     3. History of cystoid macular edema left eye, likely secondary to #1. No recurrence today.   - See management of #1    4. Epiretinal membrane left eye, not visually significant and stable.   - Monitor    Return to uveitis clinic in 3-4 months: V/T/D/mac OCT/RNFL OCT/optos fluorescein angiography transit left eye.    Attending Physician Attestation:  Complete documentation of historical and exam elements from today's encounter can be found in the full encounter summary report (not reduplicated in this progress note).  I personally obtained the chief complaint(s) and history of present illness.  I confirmed and edited as necessary the review of systems, past medical/surgical history, family history, social history, and examination findings as documented by others; and I examined the patient myself.  I personally reviewed the relevant tests, images, and  reports as documented above. I formulated and edited as necessary the assessment and plan and discussed the findings and management plan with the patient and family.  - Shannon Dominguez M.D.    Sincerely,    Shannon Dominguez MD

## 2021-04-23 NOTE — PROGRESS NOTES
HPI: Suma Schulz is a 36 year old  female here for uveitis follow up. She had a new black floater left eye for only one day last week, then gone. No flashing lights. No eye redness, pain, or photophobia.    Ocular medications: Humira q2wk (started 4.7.18), IMT managed by Dr. Ruchi Martin ([facility]).  Note: Previously flared on 25 mg/wk MTX (at max ocular dose 3 months) and 2.5 mg/day oral prednisone.  Prior ocular medications: oral prednisone (started at 60 mg/day 9.16.17 and tapered, off since 6.15.18), oral methotrexate (started 10.16.17, increased to 25 mg/wk 11.13.17, started tapering June 2020 and stopped Oct 2020), folic acid.    Ocular history:   1. 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  2. Posterior synechiae left eye.   3. Retinoschisis cavities left eye. S/p laser barricade left eye 8.31.17 ([place]).  4. IIH diagnosed in 2015 but sympoms started in 2012, treated with 4 months diamox (had side effects: joint swelling and pain). Symptoms resolved near the end of pregnancy.  5. History of red left eye about 10 years ago, treated with a short course of topical corticosteroids, patient unsure of diagnosis.     Medical history:  1. 2 uncomplicated pregnancies (vaginal delivery), although blood pressure was slightly elevated at the end of more recent pregnancy.  2. Not currently pregnant or breastfeeding.  3. Mirena IUD - not planning pregnancy soon.  4. Negative for diabetes mellitus.  5. Scalp cyst.    Social history: [employer]  . Never smoked. Alcohol use: ~one beverage monthly.     Laboratory/Imaging Results:  MRI brain/orbits 9.14.17: unremarkable.  Labs 8.31.17: Quantiferon, RPR, Treponema pallidum ab, Lyme, HIV, Toxoplasma IgM/IgG, Bartonella henselae IgM/IgG, anti-Anaplsma phagocytophilum (HGA) IgM/IgG, urinary beta-2 microglobulin, anti-MPO, anti-pro3, ACE, ESR, CRP, and  HLA-B27 all normal/negative. HSV1 IgG positive, HSV2 IgG negative. Complete blood count (CBC) with diff normal. Complete metabolic panel (CMP) unremarkable except for slightly elevated glucose (116 but non-fasting).    Ocular Imagin21 mac OCT:  Right eye: within normal limits, retinal thickening stable  Left eye: mild epiretinal membrane, no IRF/SRF, stable retinal thickness    Optos fluorescein angiography .20, stable vs 6.20:  Right eye: essentially within normal limits, no optic nerve head or large vessel leakage, late mild midperipheral small vessel leakage  Left eye: essentially complete resolution of optic nerve head/macular/large vessel/schisis cavity leakage seen in 18 fluorescein angiography    Impression/Plan:  1. 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 Inactive today.   - Continue Humira q2wk with Dr. Martin    2. Bullous retinoschisis left eye, s/p barricade laser. Does not extend posterior to laser.   - Monitor     3. History of cystoid macular edema left eye, likely secondary to #1. No recurrence today.   - See management of #1    4. Epiretinal membrane left eye, not visually significant and stable.   - Monitor      Return to uveitis clinic in 3-4 months: V/T/D/mac OCT/RNFL OCT/optos fluorescein angiography transit left eye.    Attending Physician Attestation:  Complete documentation of historical and exam elements from today's encounter can be found in the full encounter summary report (not reduplicated in this progress note).  I personally obtained the chief complaint(s) and history of present illness.  I confirmed and edited as necessary the review of systems, past medical/surgical history, family history, social history, and examination findings as documented by others; and I examined the patient myself.  I personally reviewed the relevant tests, images, and reports as documented above. I formulated and edited as necessary the assessment  and plan and discussed the findings and management plan with the patient and family.  - Shannon Dominguez M.D.

## 2021-09-10 ENCOUNTER — OFFICE VISIT (OUTPATIENT)
Dept: OPHTHALMOLOGY | Facility: CLINIC | Age: 36
End: 2021-09-10
Attending: OPHTHALMOLOGY
Payer: COMMERCIAL

## 2021-09-10 DIAGNOSIS — H35.372 EPIRETINAL MEMBRANE (ERM) OF LEFT EYE: ICD-10-CM

## 2021-09-10 DIAGNOSIS — H30.23 INTERMEDIATE UVEITIS OF BOTH EYES: Primary | ICD-10-CM

## 2021-09-10 PROCEDURE — 92134 CPTRZ OPH DX IMG PST SGM RTA: CPT | Performed by: OPHTHALMOLOGY

## 2021-09-10 PROCEDURE — G0463 HOSPITAL OUTPT CLINIC VISIT: HCPCS | Mod: 25

## 2021-09-10 PROCEDURE — 99214 OFFICE O/P EST MOD 30 MIN: CPT | Performed by: OPHTHALMOLOGY

## 2021-09-10 PROCEDURE — 92235 FLUORESCEIN ANGRPH MLTIFRAME: CPT | Performed by: OPHTHALMOLOGY

## 2021-09-10 ASSESSMENT — TONOMETRY
IOP_METHOD: TONOPEN
OD_IOP_MMHG: 19
OS_IOP_MMHG: 15

## 2021-09-10 ASSESSMENT — VISUAL ACUITY
OS_CC+: -2
OS_CC: 20/20
CORRECTION_TYPE: GLASSES
METHOD: SNELLEN - LINEAR
OD_CC: 20/20

## 2021-09-10 ASSESSMENT — CONF VISUAL FIELD
OD_NORMAL: 1
METHOD: COUNTING FINGERS

## 2021-09-10 ASSESSMENT — CUP TO DISC RATIO
OD_RATIO: 0.1
OS_RATIO: 0.1

## 2021-09-10 ASSESSMENT — SLIT LAMP EXAM - LIDS
COMMENTS: NORMAL
COMMENTS: NORMAL

## 2021-09-10 ASSESSMENT — EXTERNAL EXAM - LEFT EYE: OS_EXAM: NORMAL

## 2021-09-10 ASSESSMENT — EXTERNAL EXAM - RIGHT EYE: OD_EXAM: NORMAL

## 2021-09-10 NOTE — LETTER
9/10/2021      RE: Suma Schulz  [address] [facility] [address]       HPI: Suma Schulz is a 36 year old  female here for uveitis follow up. No new floaters. No flashing lights. No eye redness, pain, or photophobia. Vision is doing well.    Ocular medications: Humira q2wk (started 4.7.18), IMT managed by Dr. Ruchi Martin ([facility]).  Note: Previously flared on 25 mg/wk MTX (at max ocular dose 3 months) and 2.5 mg/day oral prednisone.  Prior ocular medications: oral prednisone (started at 60 mg/day 9.16.17 and tapered, off since 6.15.18), oral methotrexate (started 10.16.17, increased to 25 mg/wk 11.13.17, started tapering June 2020 and stopped Oct 2020), folic acid.    Ocular history:   1. 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  2. Posterior synechiae left eye.   3. Retinoschisis cavities left eye. S/p laser barricade left eye 8.31.17 ([place]).  4. IIH diagnosed in 2015 but sympoms started in 2012, treated with 4 months diamox (had side effects: joint swelling and pain). Symptoms resolved near the end of pregnancy.  5. History of red left eye about 10 years ago, treated with a short course of topical corticosteroids, patient unsure of diagnosis.     Medical history:  1. 2 uncomplicated pregnancies (vaginal delivery), although blood pressure was slightly elevated at the end of more recent pregnancy.  2. Not currently pregnant or breastfeeding.  3. Mirena IUD - not planning pregnancy soon.  4. Negative for diabetes mellitus.  5. Scalp cyst.    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     Laboratory/Imaging Results:  MRI brain/orbits 9.14.17: unremarkable.  Labs 8.31.17: Quantiferon, RPR, Treponema pallidum ab, Lyme, HIV, Toxoplasma IgM/IgG, Bartonella henselae IgM/IgG, anti-Anaplsma phagocytophilum (HGA) IgM/IgG, urinary beta-2 microglobulin,  anti-MPO, anti-pro3, ACE, ESR, CRP, and HLA-B27 all normal/negative. HSV1 IgG positive, HSV2 IgG negative. Complete blood count (CBC) with diff normal. Complete metabolic panel (CMP) unremarkable except for slightly elevated glucose (116 but non-fasting).    Ocular Imagin.10.21 mac OCT:  Right eye: within normal limits, retinal thickening stable  Left eye: mild epiretinal membrane, no IRF/SRF, stable retinal thickness    Optos fluorescein angiography 9.10.21, stable vs 20 and 20:  Right eye: essentially within normal limits, no optic nerve head or large vessel leakage, minimal late midperipheral small vessel leakage nasally  Left eye: essentially complete resolution of optic nerve head/macular/large vessel/schisis cavity leakage seen in 18 fluorescein angiography    Impression/Plan:  1. 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 Inactive today on examination and fluorescein angiography.   - Continue Humira q2wk with Dr. Martin    2. Bullous retinoschisis left eye, s/p barricade laser. Today, there is asymptomatic progression compared to last visit, with inferonasal schisis cavity now extending posterior to laser.   - See Dr. Land in 1-2 weeks for assessment of schisis progression. If additional laser would be indicated, this is fine from a uveitis standpoint given excellent inflammatory control at present    3. History of cystoid macular edema left eye, likely secondary to #1. No recurrence today.   - See management of #1    4. Epiretinal membrane left eye, not visually significant and stable.   - Monitor    Return to clinic to see Dr. Land in 1-2 weeks for evaluation of retinoschisis progression left eye.  Return to uveitis clinic in 4 months: V/T/D/mac OCT/RNFL OCT.    Attending Physician Attestation:  Complete documentation of historical and exam elements from today's encounter can be found in the full encounter summary report (not reduplicated  in this progress note).  I personally obtained the chief complaint(s) and history of present illness.  I confirmed and edited as necessary the review of systems, past medical/surgical history, family history, social history, and examination findings as documented by others; and I examined the patient myself.  I personally reviewed the relevant tests, images, and reports as documented above. I formulated and edited as necessary the assessment and plan and discussed the findings and management plan with the patient and family.  - Shannon Dominguez M.D.          Again, thank you for allowing me to participate in the care of your patient.    Sincerely,    Shannon Dominguez MD, PhD  Uveitis  Department of Ophthalmology & Visual Neurosciences  [facility]

## 2021-09-10 NOTE — Clinical Note
Alexsander Lozano,  I saw Juan José in clinic on Friday. Her uveitis and retinal vasculitis are well-controlled. However, she has had asymptomatic progression of the retinoschisis in her left eye, so I am referring her back to you for further evaluation. I think she is scheduled to see you later this week.  Thanks, Shannon

## 2021-09-10 NOTE — LETTER
September 10, 2021      To Whom It May Concern:    Please excuse Marleny Wells from work today, 9/10/21, as she was the  for her daughter to/from a medical appointment with us today.    Thank you for your cooperation in this matter.  Please do not hesitate to contact me if you have any further questions.    Sincerely,      MARIE WING

## 2021-09-10 NOTE — NURSING NOTE
Chief Complaints and History of Present Illnesses   Patient presents with     Follow Up     intermediate uveitis with retinal vasculitis     Chief Complaint(s) and History of Present Illness(es)     Follow Up     Laterality: both eyes    Course: stable    Associated symptoms: Negative for flashes, floaters, eye pain and headache    Comments: intermediate uveitis with retinal vasculitis              Comments     States va is the same since last visit.   Pt not using eye drops at this time.    TARAN SANTIAGO 10:57 AM September 10, 2021

## 2021-09-10 NOTE — PROGRESS NOTES
HPI: Suma Schulz is a 36 year old  female here for uveitis follow up. No new floaters. No flashing lights. No eye redness, pain, or photophobia. Vision is doing well.    Ocular medications: Humira q2wk (started 4.7.18), IMT managed by Dr. Ruchi Martin ([facility]).  Note: Previously flared on 25 mg/wk MTX (at max ocular dose 3 months) and 2.5 mg/day oral prednisone.  Prior ocular medications: oral prednisone (started at 60 mg/day 9.16.17 and tapered, off since 6.15.18), oral methotrexate (started 10.16.17, increased to 25 mg/wk 11.13.17, started tapering June 2020 and stopped Oct 2020), folic acid.    Ocular history:   1. 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  2. Posterior synechiae left eye.   3. Retinoschisis cavities left eye. S/p laser barricade left eye 8.31.17 ([place]).  4. IIH diagnosed in 2015 but sympoms started in 2012, treated with 4 months diamox (had side effects: joint swelling and pain). Symptoms resolved near the end of pregnancy.  5. History of red left eye about 10 years ago, treated with a short course of topical corticosteroids, patient unsure of diagnosis.     Medical history:  1. 2 uncomplicated pregnancies (vaginal delivery), although blood pressure was slightly elevated at the end of more recent pregnancy.  2. Not currently pregnant or breastfeeding.  3. Mirena IUD - not planning pregnancy soon.  4. Negative for diabetes mellitus.  5. Scalp cyst.    Social history: [employer] . Never smoked. Alcohol use: ~one beverage monthly.     Laboratory/Imaging Results:  MRI brain/orbits 9.14.17: unremarkable.  Labs 8.31.17: Quantiferon, RPR, Treponema pallidum ab, Lyme, HIV, Toxoplasma IgM/IgG, Bartonella henselae IgM/IgG, anti-Anaplsma phagocytophilum (HGA) IgM/IgG, urinary beta-2 microglobulin, anti-MPO, anti-pro3, ACE, ESR, CRP, and HLA-B27 all normal/negative. HSV1 IgG  positive, HSV2 IgG negative. Complete blood count (CBC) with diff normal. Complete metabolic panel (CMP) unremarkable except for slightly elevated glucose (116 but non-fasting).    Ocular Imagin.10.21 mac OCT:  Right eye: within normal limits, retinal thickening stable  Left eye: mild epiretinal membrane, no IRF/SRF, stable retinal thickness    Optos fluorescein angiography 9.10.21, stable vs 12.18.20 and 6.20:  Right eye: essentially within normal limits, no optic nerve head or large vessel leakage, minimal late midperipheral small vessel leakage nasally  Left eye: essentially complete resolution of optic nerve head/macular/large vessel/schisis cavity leakage seen in 18 fluorescein angiography    Impression/Plan:  1. 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 Inactive today on examination and fluorescein angiography.   - Continue Humira q2wk with Dr. Martin    2. Bullous retinoschisis left eye, s/p barricade laser. Today, there is asymptomatic progression compared to last visit, with inferonasal schisis cavity now extending posterior to laser.   - See Dr. Land in 1-2 weeks for assessment of schisis progression. If additional laser would be indicated, this is fine from a uveitis standpoint given excellent inflammatory control at present    3. History of cystoid macular edema left eye, likely secondary to #1. No recurrence today.   - See management of #1    4. Epiretinal membrane left eye, not visually significant and stable.   - Monitor    Return to clinic to see Dr. Land in 1-2 weeks for evaluation of retinoschisis progression left eye.  Return to uveitis clinic in 4 months: V/T/D/mac OCT/RNFL OCT.    Attending Physician Attestation:  Complete documentation of historical and exam elements from today's encounter can be found in the full encounter summary report (not reduplicated in this progress note).  I personally obtained the chief complaint(s) and  history of present illness.  I confirmed and edited as necessary the review of systems, past medical/surgical history, family history, social history, and examination findings as documented by others; and I examined the patient myself.  I personally reviewed the relevant tests, images, and reports as documented above. I formulated and edited as necessary the assessment and plan and discussed the findings and management plan with the patient and family.  - Shannon Dominguez M.D.

## 2021-09-16 ENCOUNTER — OFFICE VISIT (OUTPATIENT)
Dept: OPHTHALMOLOGY | Facility: CLINIC | Age: 36
End: 2021-09-16
Attending: OPHTHALMOLOGY
Payer: COMMERCIAL

## 2021-09-16 DIAGNOSIS — H33.192 BULLOUS RETINOSCHISIS OF LEFT EYE: ICD-10-CM

## 2021-09-16 DIAGNOSIS — H30.23 INTERMEDIATE UVEITIS OF BOTH EYES: Primary | ICD-10-CM

## 2021-09-16 DIAGNOSIS — H33.192 BULLOUS RETINOSCHISIS OF LEFT EYE: Primary | ICD-10-CM

## 2021-09-16 DIAGNOSIS — H35.352 CYSTOID MACULAR DEGENERATION OF RETINA, LEFT: ICD-10-CM

## 2021-09-16 DIAGNOSIS — H35.372 EPIRETINAL MEMBRANE (ERM) OF LEFT EYE: ICD-10-CM

## 2021-09-16 PROCEDURE — 67145 PROPH RTA DTCHMNT PC: CPT | Mod: LT | Performed by: OPHTHALMOLOGY

## 2021-09-16 PROCEDURE — G0463 HOSPITAL OUTPT CLINIC VISIT: HCPCS | Mod: 25

## 2021-09-16 PROCEDURE — 99214 OFFICE O/P EST MOD 30 MIN: CPT | Mod: 57 | Performed by: OPHTHALMOLOGY

## 2021-09-16 PROCEDURE — 92134 CPTRZ OPH DX IMG PST SGM RTA: CPT | Performed by: OPHTHALMOLOGY

## 2021-09-16 ASSESSMENT — REFRACTION_WEARINGRX
OS_CYLINDER: +2.00
OS_SPHERE: -1.50
OD_AXIS: 094
SPECS_TYPE: SVL
OS_AXIS: 078
OD_SPHERE: -0.25
OD_CYLINDER: +0.75

## 2021-09-16 ASSESSMENT — VISUAL ACUITY
METHOD: SNELLEN - LINEAR
CORRECTION_TYPE: GLASSES
OD_CC: 20/20
OS_CC: 20/20

## 2021-09-16 ASSESSMENT — CUP TO DISC RATIO
OD_RATIO: 0.1
OS_RATIO: 0.1

## 2021-09-16 ASSESSMENT — CONF VISUAL FIELD
METHOD: COUNTING FINGERS
OD_NORMAL: 1
OS_NORMAL: 1

## 2021-09-16 ASSESSMENT — TONOMETRY
OS_IOP_MMHG: 18
OD_IOP_MMHG: 18
IOP_METHOD: TONOPEN

## 2021-09-16 ASSESSMENT — EXTERNAL EXAM - LEFT EYE: OS_EXAM: NORMAL

## 2021-09-16 ASSESSMENT — EXTERNAL EXAM - RIGHT EYE: OD_EXAM: NORMAL

## 2021-09-16 ASSESSMENT — SLIT LAMP EXAM - LIDS
COMMENTS: NORMAL
COMMENTS: NORMAL

## 2021-09-16 NOTE — NURSING NOTE
"Chief Complaints and History of Present Illnesses   Patient presents with     Retinoschisis Evaluation     Chief Complaint(s) and History of Present Illness(es)     Retinoschisis Evaluation     Laterality: left eye    Associated symptoms: eye pain.  Negative for photophobia    Pain scale: 3/10              Comments     1-2 week follow up secondary to evaluation of Bullous Retinoschisis left eye with testing today.   Patient states vision is stable each eye within the last couple weeks. Patient states noticing an intermittent throbbing pain left eye for the last couple weeks. \"It's not horrible, so I haven't taken anything for it.\" Denies floaters or flashes. Denies redness or light sensitivity.     Melissa Lagos, COT 1:06 PM 09/16/2021                      "

## 2021-09-16 NOTE — PROGRESS NOTES
"CC -   Follow up  Retinoschisis  left eye     HPI -  Suma SIFUENTES is a  36 year old year-old patient presenting for evaluation of retinoschisis and cystoid macular edema of her left eye. She has a distant history of uveitis, left eye ~2006, when she presented with pain left eye and intraocular inflammation. Per patient diagnosed with uveitis and treated with topical steroids, No records available. per patient extensive lab work up test was negative, including Lyme. She also has a history of IIH. She was initially seen by Dr. Roberts in 2012 and seen at [place] with headache, whooshing sound and blurry vision. MRI images at the time showed is no evidence of an abnormality in the orbits of either eye with slight possibility of a partially empty sella without flattening of the globes or enhancement of the optic nerves      At [place] was told that \"fluid was leaking around her optic nerve\". Did not receive diagnosis of IIH until 2015 in [place] when LP showed elevated ICP and \"some elevated markers for multiple sclerosis (per patient) but not brain lesions on MRI\". She was treated for approximately four months but intolerant to diamox, including joint pain and swelling. She then became pregnant and symptoms resolved after delivery.     Symptoms were quiescent until this July, she experienced eye ache and was seen by optometrist in [place]. Found to have schisis. Previous photos taken in 2016 show progression today (per patient). Seen by Dr. Aparicio 8/23/17 and sent to [facility] for further evaluation.    Interval history 09/16/21:   Patient reports that she is having pain in the left eye. Comes and goes in nature and similar to prior episodes which occurred 4 years ago when symptoms initially started. She reports that it feels like a dull throbbing type of pain. Reports vision is intact.    Dr. Dominguez evaluated patient a week ago and symptoms were quiesent.   Using Humira currently q2 weeks for idiopathic intermediate uveitis with " retinal vasculitis left eye > right eye     She is here with interval increase of the bullous retinoschisis in the inferonasal aspect of left eye. Had barrier laser 4 years ago. Dr. Dominguez thinks could benefit from more laser barricade.     PAST OCULAR SURGERY None    RETINAL IMAGING:  OCT 9.16-21  OD - nml  OS - No Central CME. ERM present. IN mid periphery with large bullous retinoschisis without RD.     optos photos 8/31/17  OS - schisis inferonasally and inferotemporally     FAF 8/31/17  OD - nml  OS - inferotemporal and inferonasal hypoautofluorescence     FA 8/31/17  OD 1+ perivenular leakage temporally  OS bullous schisis, tr late optic nerve head leakage , 2-3+ inferotemporal and nasal perivenular leakage    ICG 8/31/17  OD normal  OS normal    Ultrasound: schisis inferiorly with minimal retina  movement on dynamic ultrasound consistent with schisis    Labs:  Treponema pallidum Antibody neg  Sed Rate 14  CRP Inflammation 4.2  Rapid Plasma Reagin neg  Angiotensin Converting Enzyme 24  M Tuberculosis Antigen Value neg  A Phagocytophil IgG IgM neg  HIV neg  Beta-2-Microglobulin Timed Urine 53 normal   complete blood count (CBC): normal   Comprehensive metabolic panel : normal   Vasculitis panel : normal   B Henselae DARRION IgG: neg  Toxoplasma Antibody IgGand IgM normal   Lyme: neg  Herpes simples virus 1 IgG >8.0 high  Herpes simples virus 2 IgG <0.2  HLAB27 neg    ASSESSMENT & PLAN  # Intermediate uveitis, Idiopathic   Right eye: Mild occult vasculitis detected on fluorescein angiography  left eye: retinal vasculitis, vitritis and cystoid macular edema. vitritis and cystoid macular edema likely chronic   Unclear etiology at this point. Long term history of uveitis left eye with prior lab work up negative   Per pt had elevated MS markers on previous LP (will obtain records)   FA with perivascular leakage concerning for vasculitis OS>OD in past   Quiescent today    Currently on Humira q2 weeks     # Bullous  Retinoschisis, left eye status post laser retinopexy left eye    Good laser demarcation and fluid not extending posterior to the laser although the bullous elevation has increased in size c/t 12/2020.   - inner retina hole noted on examination, possibly contributing to schisis progression   - can't rule out serous vs exudative component given history of uveitis and vaculitis on fluorescein angiography    - recommend adding laser retinopexy   - If continues progression to Retinal detachment despite laser, will consider surgical repair with vitreous biopsy   - Consider follow up with fluorescein angiography and Optical Coherence Tomography q3 months approximately or per uveitis recs    - Will obtain laser retinal barricade today left eye - r/b/a discussed with patient. Will proceed today.     # Epiretinal membrane, left eye   Likely related to #2   observe    # refractive error   -mild astigmatism   Ok to fill previous Rx    return to clinic: 4- 6 weeks with optos Autofluorescence and OCT.    Kermit Meadows MD - PGY3  Department of Ophthalmology  Pager: [phone]    ~~~~~~~~~~~~~~~~~~~~~~~~~~~~~~~~~~   Complete documentation of historical and exam elements from today's encounter can be found in the full encounter summary report (not reduplicated in this progress note).  I personally obtained the chief complaint(s) and history of present illness.  I confirmed and edited as necessary the review of systems, past medical/surgical history, family history, social history, and examination findings as documented by others; and I examined the patient myself.  I personally reviewed the relevant tests, images, and reports as documented above.  I personally reviewed the ophthalmic test(s) associated with this encounter, agree with the interpretation(s) as documented by the resident/fellow, and have edited the corresponding report(s) as necessary.   I formulated and edited as necessary the assessment and plan and discussed the  findings and management plan with the patient and family and No resident or fellow assisted with the procedures performed.  I performed the procedures myself.    Marta Land MD  .  Retina Service   Department of Ophthalmology and Visual Neurosciences   [facility]  Phone: [phone]   Fax: [phone]

## 2021-10-02 ENCOUNTER — HEALTH MAINTENANCE LETTER (OUTPATIENT)
Age: 36
End: 2021-10-02

## 2021-10-14 ENCOUNTER — OFFICE VISIT (OUTPATIENT)
Dept: OPHTHALMOLOGY | Facility: CLINIC | Age: 36
End: 2021-10-14
Attending: OPHTHALMOLOGY
Payer: COMMERCIAL

## 2021-10-14 DIAGNOSIS — H33.192 BULLOUS RETINOSCHISIS OF LEFT EYE: Primary | ICD-10-CM

## 2021-10-14 DIAGNOSIS — H33.192 BULLOUS RETINOSCHISIS OF LEFT EYE: ICD-10-CM

## 2021-10-14 PROCEDURE — 92012 INTRM OPH EXAM EST PATIENT: CPT | Performed by: OPHTHALMOLOGY

## 2021-10-14 PROCEDURE — 99207 FUNDUS AUTOFLUORESCENCE IMAGE (FAF) OU (BOTH EYES): CPT | Performed by: OPHTHALMOLOGY

## 2021-10-14 PROCEDURE — 92134 CPTRZ OPH DX IMG PST SGM RTA: CPT | Performed by: OPHTHALMOLOGY

## 2021-10-14 PROCEDURE — G0463 HOSPITAL OUTPT CLINIC VISIT: HCPCS | Mod: 25

## 2021-10-14 PROCEDURE — 92250 FUNDUS PHOTOGRAPHY W/I&R: CPT | Mod: 59 | Performed by: OPHTHALMOLOGY

## 2021-10-14 ASSESSMENT — VISUAL ACUITY
CORRECTION_TYPE: GLASSES
OD_CC: 20/20
OS_CC: 20/20
METHOD: SNELLEN - LINEAR

## 2021-10-14 ASSESSMENT — SLIT LAMP EXAM - LIDS
COMMENTS: NORMAL
COMMENTS: NORMAL

## 2021-10-14 ASSESSMENT — EXTERNAL EXAM - RIGHT EYE: OD_EXAM: NORMAL

## 2021-10-14 ASSESSMENT — CONF VISUAL FIELD
METHOD: COUNTING FINGERS
OS_NORMAL: 1
OD_NORMAL: 1

## 2021-10-14 ASSESSMENT — TONOMETRY
IOP_METHOD: TONOPEN
OS_IOP_MMHG: 13
OD_IOP_MMHG: 13

## 2021-10-14 ASSESSMENT — EXTERNAL EXAM - LEFT EYE: OS_EXAM: NORMAL

## 2021-10-14 ASSESSMENT — CUP TO DISC RATIO
OS_RATIO: 0.1
OD_RATIO: 0.1

## 2021-10-14 NOTE — PROGRESS NOTES
"CC -   Follow up  Retinoschisis  left eye     HPI -  Suma SIFUENTES is a  36 year old year-old patient presenting for evaluation of retinoschisis and cystoid macular edema of her left eye. She has a distant history of uveitis, left eye ~2006, when she presented with pain left eye and intraocular inflammation. Per patient diagnosed with uveitis and treated with topical steroids, No records available. per patient extensive lab work up test was negative, including Lyme. She also has a history of IIH. She was initially seen by Dr. Roberts in 2012 and seen at [place] with headache, whooshing sound and blurry vision. MRI images at the time showed is no evidence of an abnormality in the orbits of either eye with slight possibility of a partially empty sella without flattening of the globes or enhancement of the optic nerves      At [place] was told that \"fluid was leaking around her optic nerve\". Did not receive diagnosis of IIH until 2015 in [place] when LP showed elevated ICP and \"some elevated markers for multiple sclerosis (per patient) but not brain lesions on MRI\". She was treated for approximately four months but intolerant to diamox, including joint pain and swelling. She then became pregnant and symptoms resolved after delivery.     Symptoms were quiescent until this July, she experienced eye ache and was seen by optometrist in [place]. Found to have schisis. Previous photos taken in 2016 show progression today (per patient). Seen by Dr. Aparicio 8/23/17 and sent [facility] for further evaluation.    Interval history 09/16/21:   Patient reports that she is having pain in the left eye. Comes and goes in nature and similar to prior episodes which occurred 4 years ago when symptoms initially started. She reports that it feels like a dull throbbing type of pain. Reports vision is intact.    Dr. Dominguez evaluated patient a week ago and symptoms were quiesent.   Using Humira currently q2 weeks for idiopathic intermediate uveitis with " retinal vasculitis left eye > right eye     She is here with interval increase of the bullous retinoschisis in the inferonasal aspect of left eye. Had barrier laser 4 years ago. Dr. Dominguez thinks could benefit from more laser barricade.     PAST OCULAR SURGERY None    RETINAL IMAGING:  Optical Coherence Tomography 10/14/21   OD - nml  OS - No Central CME. ERM present.   IN mid periphery with large bullous retinoschisis without RD.     optos photos 10/14/21   OS - schisis inferonasally and inferotemporally   FAF 10/14/21   OD - nml  OS - inferotemporal and inferonasal hypoautofluorescence with hyperautofluorescence laser spots demarcating Inferior nasal schisis    FA 8/31/17  OD 1+ perivenular leakage temporally  OS bullous schisis, tr late optic nerve head leakage , 2-3+ inferotemporal and nasal perivenular leakage    ICG 8/31/17  OD normal  OS normal    Ultrasound: schisis inferiorly with minimal retina  movement on dynamic ultrasound consistent with schisis    Labs:  Treponema pallidum Antibody neg  Sed Rate 14  CRP Inflammation 4.2  Rapid Plasma Reagin neg  Angiotensin Converting Enzyme 24  M Tuberculosis Antigen Value neg  A Phagocytophil IgG IgM neg  HIV neg  Beta-2-Microglobulin Timed Urine 53 normal   complete blood count (CBC): normal   Comprehensive metabolic panel : normal   Vasculitis panel : normal   B Henselae DARRION IgG: neg  Toxoplasma Antibody IgGand IgM normal   Lyme: neg  Herpes simples virus 1 IgG >8.0 high  Herpes simples virus 2 IgG <0.2  HLAB27 neg    ASSESSMENT & PLAN    # Bullous Retinoschisis, left eye status post laser retinopexy left eye   - status post laser retinal barricade 9.16.21   Good laser demarcation and fluid not extending posterior to the laser although the bullous elevation has increased in size c/t 12/2020.   - inner retina hole noted on examination, possibly contributing to schisis progression   - can't rule out serous vs exudative component given history of uveitis and  vaculitis on fluorescein angiography    - If continues progression to Retinal detachment despite laser, will consider surgical repair with vitreous biopsy   - Consider follow up with fluorescein angiography and Optical Coherence Tomography q3 months approximately or per uveitis recs     # Intermediate uveitis, Idiopathic   Right eye: Mild occult vasculitis detected on fluorescein angiography  left eye: retinal vasculitis, vitritis and cystoid macular edema. vitritis and cystoid macular edema likely chronic   Unclear etiology at this point. Long term history of uveitis left eye with prior lab work up negative   Per pt had elevated MS markers on previous light perception. Has had normal MRI in the past   FA with perivascular leakage concerning for vasculitis OS>OD in past   Quiescent today    Currently on Humira q2 weeks     # Epiretinal membrane, left eye   Likely related to #2   observe    # refractive error   -mild astigmatism   Ok to fill previous Rx    return to clinic: patient will follow up with Dr. Dominguez in Jan with possible fluorescein angiography.  Will follow up with retina as needed  Retina detachment precautions were discussed with the patient (presence or increased in flashes, floaters or a curtain in the visual field) and was asked to return if any of the those occur      ~~~~~~~~~~~~~~~~~~~~~~~~~~~~~~~~~~   Complete documentation of historical and exam elements from today's encounter can be found in the full encounter summary report (not reduplicated in this progress note).  I personally obtained the chief complaint(s) and history of present illness.  I confirmed and edited as necessary the review of systems, past medical/surgical history, family history, social history, and examination findings as documented by others; and I examined the patient myself.  I personally reviewed the relevant tests, images, and reports as documented above.  I formulated and edited as necessary the assessment and plan  and discussed the findings and management plan with the patient and family    Marta Land MD  .  Retina Service   Department of Ophthalmology and Visual Neurosciences   [facility]  Phone: [phone]   Fax: [phone]

## 2021-10-14 NOTE — NURSING NOTE
Chief Complaints and History of Present Illnesses   Patient presents with     Follow Up     Chief Complaint(s) and History of Present Illness(es)     Follow Up     Laterality: both eyes    Frequency: constantly    Timing: throughout the day    Course: stable    Associated symptoms: Negative for redness, flashes, floaters, tearing and dryness              Comments     Retinoschisis of LE /  Pt states VA seems unchanged and that BE are comfortable. Pt states no new concerns today.  Doris Motley, COT COT 1:17 PM 10/14/2021

## 2021-11-12 ENCOUNTER — TRANSFERRED RECORDS (OUTPATIENT)
Dept: HEALTH INFORMATION MANAGEMENT | Facility: CLINIC | Age: 36
End: 2021-11-12
Payer: COMMERCIAL

## 2021-12-13 ENCOUNTER — TELEPHONE (OUTPATIENT)
Dept: OPHTHALMOLOGY | Facility: CLINIC | Age: 36
End: 2021-12-13
Payer: COMMERCIAL

## 2021-12-13 DIAGNOSIS — H30.23 INTERMEDIATE UVEITIS OF BOTH EYES: Primary | ICD-10-CM

## 2021-12-13 DIAGNOSIS — H35.352 CYSTOID MACULAR DEGENERATION OF RETINA, LEFT: ICD-10-CM

## 2021-12-13 NOTE — TELEPHONE ENCOUNTER
[facility] left a VM on the triage line.  I returned the call.  Dr. Ruchi Martin has a question about the patient's medication.  She want to know if she should monitor off the medicine or is it preferable to start the Methotrexate.  Message sent to provider          I called patient to discuss but she was not available. Message left for patient. I think it is reasonable to monitor off immunosuppressive medication at this time and see her as scheduled in early Feb 2022.    I called [facility] but office was closed and there was no voice mail system. Will ask office staff to call [phone] next week to let Dr. Ruchi Martin know that monitoring patient off immunosuppression is reasonable at this time.    Shannon Dominguez MD

## 2022-03-18 ENCOUNTER — OFFICE VISIT (OUTPATIENT)
Dept: OPHTHALMOLOGY | Facility: CLINIC | Age: 37
End: 2022-03-18
Attending: OPHTHALMOLOGY
Payer: COMMERCIAL

## 2022-03-18 DIAGNOSIS — H35.352 CYSTOID MACULAR DEGENERATION OF RETINA, LEFT: ICD-10-CM

## 2022-03-18 DIAGNOSIS — H30.23 INTERMEDIATE UVEITIS OF BOTH EYES: Primary | ICD-10-CM

## 2022-03-18 DIAGNOSIS — H35.372 EPIRETINAL MEMBRANE (ERM) OF LEFT EYE: ICD-10-CM

## 2022-03-18 PROBLEM — U07.1 COVID-19 VIRUS INFECTION: Status: ACTIVE | Noted: 2021-11-26

## 2022-03-18 PROBLEM — E83.42 HYPOMAGNESEMIA: Status: ACTIVE | Noted: 2021-11-27

## 2022-03-18 PROBLEM — G37.3 TRANSVERSE MYELITIS (H): Status: ACTIVE | Noted: 2021-11-26

## 2022-03-18 PROCEDURE — 92134 CPTRZ OPH DX IMG PST SGM RTA: CPT | Performed by: OPHTHALMOLOGY

## 2022-03-18 PROCEDURE — G0463 HOSPITAL OUTPT CLINIC VISIT: HCPCS | Mod: 25

## 2022-03-18 PROCEDURE — 99214 OFFICE O/P EST MOD 30 MIN: CPT | Performed by: OPHTHALMOLOGY

## 2022-03-18 ASSESSMENT — CONF VISUAL FIELD
OD_NORMAL: 1
OS_NORMAL: 1

## 2022-03-18 ASSESSMENT — REFRACTION_WEARINGRX
OS_AXIS: 078
OS_CYLINDER: +2.00
SPECS_TYPE: SVL
OD_AXIS: 094
OD_CYLINDER: +0.75
OS_SPHERE: -1.50
OD_SPHERE: -0.25

## 2022-03-18 ASSESSMENT — TONOMETRY
OD_IOP_MMHG: 23
IOP_METHOD: TONOPEN
OS_IOP_MMHG: 19

## 2022-03-18 ASSESSMENT — CUP TO DISC RATIO
OS_RATIO: 0.1
OD_RATIO: 0.1

## 2022-03-18 ASSESSMENT — EXTERNAL EXAM - LEFT EYE: OS_EXAM: NORMAL

## 2022-03-18 ASSESSMENT — SLIT LAMP EXAM - LIDS
COMMENTS: NORMAL
COMMENTS: NORMAL

## 2022-03-18 ASSESSMENT — VISUAL ACUITY
OD_CC+: -1
OS_CC: 20/20
METHOD: SNELLEN - LINEAR
CORRECTION_TYPE: GLASSES
OD_CC: 20/20

## 2022-03-18 ASSESSMENT — EXTERNAL EXAM - RIGHT EYE: OD_EXAM: NORMAL

## 2022-03-18 NOTE — PROGRESS NOTES
HPI: Suma Schulz is a 37 year old  female here for uveitis follow up. She has been off Humira since early November; no IMT. No new floaters. No flashing lights. No eye redness or photophobia. Vision is doing well and is stable. She does have intermittent eye pain, since December, lasts 1-2 minutes, once every couple of days, left eye. More often occurs in evening.    Ocular medications: none.  Note: Previously flared on 25 mg/wk MTX (at max ocular dose 3 months) and 2.5 mg/day oral prednisone.  Prior ocular medications: oral prednisone (started at 60 mg/day 9.16.17 and tapered, off since 6.15.18), oral methotrexate (started 10.16.17, increased to 25 mg/wk 11.13.17, started tapering June 2020 and stopped Oct 2020), Humira q2wk (4.7.18 - early Nov 2021), IMT managed by Dr. Ruchi Martin ([facility]).    Ocular history:   1. 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  2. Posterior synechiae left eye.   3. Retinoschisis cavities left eye.    - 9/2021 noted to have increased size of bullous elevation vs 12/2020, possibly related to inner retinal hole -> S/p additional laser barricade left eye 9.16.21 ([place])    - S/p laser barricade left eye 8.31.17 ([place])  4. IIH diagnosed in 2015 but sympoms started in 2012, treated with 4 months diamox (had side effects: joint swelling and pain). Symptoms resolved near the end of pregnancy.  5. History of red left eye about 10 years prior to uveitis diagnosis, treated with a short course of topical corticosteroids, patient unsure of diagnosis.     Medical history:  1. 2 uncomplicated pregnancies (vaginal delivery), although blood pressure was slightly elevated at the end of more recent pregnancy.  2. Not currently pregnant or breastfeeding.  3. Mirena IUD - not planning pregnancy soon.  4. Negative for diabetes mellitus.  5. Scalp cyst.    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 Alcohol use: ~one beverage monthly.     Laboratory/Imaging Results:  MRI brain/orbits 9.14.17: unremarkable.  Labs 8.31.17: Quantiferon, RPR, Treponema pallidum ab, Lyme, HIV, Toxoplasma IgM/IgG, Bartonella henselae IgM/IgG, anti-Anaplsma phagocytophilum (HGA) IgM/IgG, urinary beta-2 microglobulin, anti-MPO, anti-pro3, ACE, ESR, CRP, and HLA-B27 all normal/negative. HSV1 IgG positive, HSV2 IgG negative. Complete blood count (CBC) with diff normal. Complete metabolic panel (CMP) unremarkable except for slightly elevated glucose (116 but non-fasting).    Ocular Imaging:  3.18.22 mac OCT:  Right eye: within normal limits, retinal thickening stable vs 10.14.21  Left eye: mild epiretinal membrane, no IRF/SRF, stable retinal thickness vs 10.14.21    Optos fluorescein angiography 9.10.21, stable vs 12.18.20 and 6.19.20:  Right eye: essentially within normal limits, no optic nerve head or large vessel leakage, minimal late midperipheral small vessel leakage nasally  Left eye: essentially complete resolution of optic nerve head/macular/large vessel/schisis cavity leakage seen in 2.23.18 fluorescein angiography    Impression/Plan:  1. 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 Inactive today on examination. No thickening on mac OCT or retinal nerve fiber layer OCT.   - Continue off systemic IMT   - Return to clinic in 2-3 months (this will be 6 months off Humira) for repeat exam and fluorescein angiography    2. Bullous retinoschisis left eye, s/p barricade laser. Retinoschisis well-circumscribed by laser.   - I personally reviewed Optos photography from 10/2021 visit with Dr. Land, which documents additional laser retinopexy left eye. Height of retinoschisis and laser demarcation on exam today is stable compared to those photos.   - Appreciate co-management by Dr. Land   -Monitor    3. History of cystoid macular edema left eye, likely secondary to #1. No  recurrence today.   - See management of #1    4. Epiretinal membrane left eye, not visually significant and stable.   - Monitor      Return to uveitis clinic in 2-3 months: V/T/D/mac OCT/RNFL OCT/Optos fluorescein angiography transit left eye.    Attending Physician Attestation:  Complete documentation of historical and exam elements from today's encounter can be found in the full encounter summary report (not reduplicated in this progress note).  I personally obtained the chief complaint(s) and history of present illness.  I confirmed and edited as necessary the review of systems, past medical/surgical history, family history, social history, and examination findings as documented by others; and I examined the patient myself.  I personally reviewed the relevant tests, images, and reports as documented above. I formulated and edited as necessary the assessment and plan and discussed the findings and management plan with the patient and family.  - Shannon Dominguez M.D.

## 2022-05-14 ENCOUNTER — HEALTH MAINTENANCE LETTER (OUTPATIENT)
Age: 37
End: 2022-05-14

## 2022-07-06 NOTE — NURSING NOTE
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              
Home

## 2022-07-08 ENCOUNTER — OFFICE VISIT (OUTPATIENT)
Dept: OPHTHALMOLOGY | Facility: CLINIC | Age: 37
End: 2022-07-08
Attending: OPHTHALMOLOGY
Payer: COMMERCIAL

## 2022-07-08 DIAGNOSIS — H35.372 EPIRETINAL MEMBRANE (ERM) OF LEFT EYE: ICD-10-CM

## 2022-07-08 DIAGNOSIS — H30.23 INTERMEDIATE UVEITIS OF BOTH EYES: ICD-10-CM

## 2022-07-08 DIAGNOSIS — H35.352 CYSTOID MACULAR DEGENERATION OF RETINA, LEFT: ICD-10-CM

## 2022-07-08 PROCEDURE — 92235 FLUORESCEIN ANGRPH MLTIFRAME: CPT | Performed by: OPHTHALMOLOGY

## 2022-07-08 PROCEDURE — G0463 HOSPITAL OUTPT CLINIC VISIT: HCPCS

## 2022-07-08 PROCEDURE — 99214 OFFICE O/P EST MOD 30 MIN: CPT | Performed by: OPHTHALMOLOGY

## 2022-07-08 PROCEDURE — 92134 CPTRZ OPH DX IMG PST SGM RTA: CPT | Performed by: OPHTHALMOLOGY

## 2022-07-08 RX ORDER — OFATUMUMAB 20 MG/.4ML
20 INJECTION, SOLUTION SUBCUTANEOUS
COMMUNITY
Start: 2022-04-07

## 2022-07-08 ASSESSMENT — TONOMETRY
OS_IOP_MMHG: 11
IOP_METHOD: TONOPEN
OD_IOP_MMHG: 15

## 2022-07-08 ASSESSMENT — SLIT LAMP EXAM - LIDS
COMMENTS: NORMAL
COMMENTS: NORMAL

## 2022-07-08 ASSESSMENT — CUP TO DISC RATIO
OS_RATIO: 0.1
OD_RATIO: 0.1

## 2022-07-08 ASSESSMENT — REFRACTION_WEARINGRX
OD_SPHERE: -0.25
SPECS_TYPE: SVL
OS_SPHERE: -1.50
OS_AXIS: 078
OS_CYLINDER: +2.00
OD_AXIS: 094
OD_CYLINDER: +0.75

## 2022-07-08 ASSESSMENT — EXTERNAL EXAM - RIGHT EYE: OD_EXAM: NORMAL

## 2022-07-08 ASSESSMENT — VISUAL ACUITY
OS_CC: 20/20
METHOD: SNELLEN - LINEAR
OD_CC: 20/20

## 2022-07-08 ASSESSMENT — CONF VISUAL FIELD
OD_NORMAL: 1
METHOD: COUNTING FINGERS
OS_NORMAL: 1

## 2022-07-08 ASSESSMENT — EXTERNAL EXAM - LEFT EYE: OS_EXAM: NORMAL

## 2022-07-08 NOTE — PROGRESS NOTES
"HPI: Suma Schulz is a 37 year old  female here for uveitis follow up. She has been off Humira since early November. She was diagnosed with multiple sclerosis in March 2022 and started ofatumumab (Kesimpta, anti-CD20 mAb) in April 2022.    Ocular medications: none.  Prior ocular medications: oral prednisone (started at 60 mg/day 9.16.17 and tapered, off since 6.15.18), oral methotrexate (started 10.16.17, increased to 25 mg/wk 11.13.17, started tapering June 2020 and stopped Oct 2020), Humira q2wk (4.7.18 - early Nov 2021), IMT previously managed by Dr. Ruchi Martin ([facility] [place]).    Ocular history:   1. 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  2. Posterior synechiae left eye.   3. Retinoschisis cavities left eye.    - 9/2021 noted to have increased size of bullous elevation vs 12/2020, possibly related to inner retinal hole -> S/p additional laser barricade left eye 9.16.21 ([place])    - S/p laser barricade left eye 8.31.17 ([place])  4. IIH diagnosed in 2015 but sympoms started in 2012, treated with 4 months diamox (had side effects: joint swelling and pain). Symptoms resolved near the end of pregnancy.  5. History of red left eye about 10 years prior to uveitis diagnosis, treated with a short course of topical corticosteroids, patient unsure of diagnosis.     Medical history:  1. Multiple sclerosis, diagnosed March 2022.   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 2 uncomplicated pregnancies (vaginal delivery), although blood pressure was slightly elevated at the end of more recent pregnancy.    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     Laboratory/Imaging Results:  -MRI brain/orbits 9.14.17: unremarkable.  -MRI brain 11.26.21 Impression: \"Few imaging findings of white matter hyperintense change " "involves the left mesial frontal lobe, periventricular space, left aiden and medulla. Only the left frontal subcortical space shows enhancement. The collection of above findings is nonspecific and not typical of MS demyelination.\"  -MRI brain 3.10.22 Impression: \"Solitary new subcentimeter signal abnormality in the anterior left frontal lobe along the gray-white matter junction. Pre-existing enhancing signal abnormality in the anterior left frontal subcortical white matter is less conspicuous and no longer enhancing. Pre-existing left ventral medullary signal abnormality is mildly less conspicuous. Otherwise, demyelinating disease is stable without evidence of active demyelination.\"  -MRI thoracic spine 3.10.22 Impression: \"Interval improvement in the multifocal signal abnormalities likely representing demyelination in the thoracic spine with the most conspicuous signal abnormalities at the T3-T4 and T5-T6 levels. No evidence of active demyelination.\"    Labs 17: Quantiferon, RPR, Treponema pallidum ab, Lyme, HIV, Toxoplasma IgM/IgG, Bartonella henselae IgM/IgG, anti-Anaplsma phagocytophilum (HGA) IgM/IgG, urinary beta-2 microglobulin, anti-MPO, anti-pro3, ACE, ESR, CRP, and HLA-B27 all normal/negative. HSV1 IgG positive, HSV2 IgG negative. Complete blood count (CBC) with diff normal. Complete metabolic panel (CMP) unremarkable except for slightly elevated glucose (116 but non-fasting).    Ocular Imagin22 mac OCT:  Right eye: within normal limits, retinal thickening stable vs 3.18.22  Left eye: mild epiretinal membrane, no IRF/SRF, stable retinal thickness vs 3.18.22    Optos fluorescein angiography 22, stable vs 9.10.21, 12.18.20 and 6..20:  Right eye: essentially within normal limits, no optic nerve head or large vessel leakage, minimal late midperipheral small vessel leakage nasally  Left eye: essentially complete resolution of optic nerve head/macular/large vessel/schisis cavity leakage seen in " 2.23.18 fluorescein angiography    Impression/Plan:  1. 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 Inactive today on examination and fluorescein angiography. No interval thickening on macular OCT or retinal nerve fiber layer OCT.   - Continue off systemic IMT for ocular disease (note that Kesimpta prescribed for multiple sclerosis may be helpful for controlling uveitis as well)    2. Bullous retinoschisis left eye, s/p barricade laser. Retinoschisis well-circumscribed by laser.   - Appreciate co-management by Dr. Land   -Monitor    3. History of cystoid macular edema left eye, likely secondary to #1. No recurrence today.   - See management of #1    4. Epiretinal membrane left eye, not visually significant and stable.   - Monitor      Return to uveitis clinic in 3-4 months, V/T/mac OCT    Review of prior external note(s) from - Outside records from [facility] (neurology, neuroimaging)  I spent a total of 33 minutes on the day of the visit.   Time spent doing chart review, history and exam, documentation and further activities per the note        Time spent on the date of the encounter doing chart review, history and exam, documentation and further activities as noted above.       Attending Physician Attestation:  Complete documentation of historical and exam elements from today's encounter can be found in the full encounter summary report (not reduplicated in this progress note).  I personally obtained the chief complaint(s) and history of present illness.  I confirmed and edited as necessary the review of systems, past medical/surgical history, family history, social history, and examination findings as documented by others; and I examined the patient myself.  I formulated and edited as necessary the assessment and plan and discussed the findings and management plan with the patient and family.  - Shannon Dominguez M.D.

## 2022-09-03 ENCOUNTER — HEALTH MAINTENANCE LETTER (OUTPATIENT)
Age: 37
End: 2022-09-03

## 2022-11-18 ENCOUNTER — OFFICE VISIT (OUTPATIENT)
Dept: OPHTHALMOLOGY | Facility: CLINIC | Age: 37
End: 2022-11-18
Attending: OPHTHALMOLOGY
Payer: COMMERCIAL

## 2022-11-18 DIAGNOSIS — H35.372 EPIRETINAL MEMBRANE (ERM) OF LEFT EYE: ICD-10-CM

## 2022-11-18 DIAGNOSIS — H35.352 CYSTOID MACULAR DEGENERATION OF RETINA, LEFT: ICD-10-CM

## 2022-11-18 DIAGNOSIS — H30.23 INTERMEDIATE UVEITIS OF BOTH EYES: Primary | ICD-10-CM

## 2022-11-18 PROCEDURE — 99213 OFFICE O/P EST LOW 20 MIN: CPT | Performed by: OPHTHALMOLOGY

## 2022-11-18 PROCEDURE — 92134 CPTRZ OPH DX IMG PST SGM RTA: CPT | Performed by: OPHTHALMOLOGY

## 2022-11-18 PROCEDURE — 92134 CPTRZ OPH DX IMG PST SGM RTA: CPT | Mod: 26 | Performed by: OPHTHALMOLOGY

## 2022-11-18 PROCEDURE — G0463 HOSPITAL OUTPT CLINIC VISIT: HCPCS

## 2022-11-18 ASSESSMENT — CONF VISUAL FIELD
OS_SUPERIOR_NASAL_RESTRICTION: 0
OD_SUPERIOR_NASAL_RESTRICTION: 0
OD_INFERIOR_TEMPORAL_RESTRICTION: 0
OS_INFERIOR_NASAL_RESTRICTION: 0
OS_NORMAL: 1
METHOD: COUNTING FINGERS
OD_SUPERIOR_TEMPORAL_RESTRICTION: 0
OD_NORMAL: 1
OS_SUPERIOR_TEMPORAL_RESTRICTION: 0
OD_INFERIOR_NASAL_RESTRICTION: 0
OS_INFERIOR_TEMPORAL_RESTRICTION: 0

## 2022-11-18 ASSESSMENT — VISUAL ACUITY
OD_CC: 20/20
CORRECTION_TYPE: GLASSES
METHOD: SNELLEN - LINEAR
OS_CC: 20/20

## 2022-11-18 ASSESSMENT — TONOMETRY
OS_IOP_MMHG: 15
IOP_METHOD: TONOPEN
OD_IOP_MMHG: 15

## 2022-11-18 ASSESSMENT — CUP TO DISC RATIO
OS_RATIO: 0.1
OD_RATIO: 0.1

## 2022-11-18 ASSESSMENT — SLIT LAMP EXAM - LIDS
COMMENTS: NORMAL
COMMENTS: NORMAL

## 2022-11-18 ASSESSMENT — EXTERNAL EXAM - RIGHT EYE: OD_EXAM: NORMAL

## 2022-11-18 ASSESSMENT — EXTERNAL EXAM - LEFT EYE: OS_EXAM: NORMAL

## 2022-11-18 NOTE — NURSING NOTE
Chief Complaints and History of Present Illnesses   Patient presents with     Uveitis Follow-Up     Chief Complaint(s) and History of Present Illness(es)     Uveitis Follow-Up            Laterality: both eyes    Onset: gradual    Quality: Stable VA    Frequency: intermittently    Course: stable    Associated symptoms: Negative for eye pain, photophobia, flashes and floaters    Treatments tried: no treatments          Comments    Here for intermediate uveitis both eyes. Vision is stable in both eyes.     Kj CHAIREZ 11:16 AM November 18, 2022

## 2022-11-18 NOTE — PROGRESS NOTES
HPI: Suma Schulz is a 37 year old  female here for uveitis follow up. She has been off Humira for 1 year. She was diagnosed with multiple sclerosis in March 2022 and started ofatumumab (Kesimpta, anti-CD20 mAb) in April 2022. No eye concerns today. Vision is good and stable. Rare floaters left eye, no worse than prior visit. No flashing lights.    Outside notes reviewed:  7/15/22 rheumatology visit: doing well, not requiring steroid-sparing medications, return as needed.  6/21/22 neurology visit: doing well on Kesimpta, vitamin D, vitamin B12; continue these medications.    Ocular medications: none.  Prior ocular medications: oral prednisone (started at 60 mg/day 9.16.17 and tapered, off since 6.15.18), oral methotrexate (started 10.16.17, increased to 25 mg/wk 11.13.17, started tapering June 2020 and stopped Oct 2020), Humira q2wk (4.7.18 - early Nov 2021), IMT previously managed by Dr. Ruchi Martin ([facility]).    Ocular history:   1. 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  2. Posterior synechiae left eye.   3. Retinoschisis cavities left eye.    - 9/2021 noted to have increased size of bullous elevation vs 12/2020, possibly related to inner retinal hole -> S/p additional laser barricade left eye 9.16.21 ([place])    - S/p laser barricade left eye 8.31.17 ([place])  4. IIH diagnosed in 2015 but sympoms started in 2012, treated with 4 months diamox (had side effects: joint swelling and pain). Symptoms resolved near the end of pregnancy.  5. History of red left eye about 10 years prior to uveitis diagnosis, treated with a short course of topical corticosteroids, patient unsure of diagnosis.     Medical history:  1. Multiple sclerosis, diagnosed March 2022.   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 2 uncomplicated pregnancies (vaginal delivery), although  "blood pressure was slightly elevated at the end of 2nd pregnancy.  3. Basal cell carcinoma s/p surgical excision in 2019.    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     Laboratory/Imaging Results:  -MRI brain/orbits 17: unremarkable.  -MRI brain 21 Impression: \"Few imaging findings of white matter hyperintense change involves the left mesial frontal lobe, periventricular space, left aiden and medulla. Only the left frontal subcortical space shows enhancement. The collection of above findings is nonspecific and not typical of MS demyelination.\"  -MRI brain 3.10.22 Impression: \"Solitary new subcentimeter signal abnormality in the anterior left frontal lobe along the gray-white matter junction. Pre-existing enhancing signal abnormality in the anterior left frontal subcortical white matter is less conspicuous and no longer enhancing. Pre-existing left ventral medullary signal abnormality is mildly less conspicuous. Otherwise, demyelinating disease is stable without evidence of active demyelination.\"  -MRI thoracic spine 3.10.22 Impression: \"Interval improvement in the multifocal signal abnormalities likely representing demyelination in the thoracic spine with the most conspicuous signal abnormalities at the T3-T4 and T5-T6 levels. No evidence of active demyelination.\"    Labs 17: Quantiferon, RPR, Treponema pallidum ab, Lyme, HIV, Toxoplasma IgM/IgG, Bartonella henselae IgM/IgG, anti-Anaplsma phagocytophilum (HGA) IgM/IgG, urinary beta-2 microglobulin, anti-MPO, anti-pro3, ACE, ESR, CRP, and HLA-B27 all normal/negative. HSV1 IgG positive, HSV2 IgG negative. Complete blood count (CBC) with diff normal. Complete metabolic panel (CMP) unremarkable except for slightly elevated glucose (116 but non-fasting).    Ocular Imagin22 mac OCT:  Right eye: within normal limits, retinal thickening stable vs 22  Left eye: mild epiretinal membrane, no " IRF/SRF, stable retinal thickness vs 7.8.22    Optos fluorescein angiography 7.8.22, stable vs 9.10.21, 12.18.20 and 6.19.20:  Right eye: essentially within normal limits, no optic nerve head or large vessel leakage, minimal late midperipheral small vessel leakage nasally  Left eye: essentially complete resolution of optic nerve head/macular/large vessel/schisis cavity leakage seen in 2.23.18 fluorescein angiography    Impression/Plan:  1. 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 Fluorescein angiography last visit showed inactive retinal vasculitis. Uveitis remains inactive today on examination. No interval thickening on macular OCT.   - Continue off systemic IMT for ocular disease (note that Kesimpta prescribed for multiple sclerosis may be helpful for controlling uveitis as well)   - Repeat fluorescein angiography in summer/fall 2023    2. History of cystoid macular edema left eye, likely secondary to #1. No recurrence today.   - See management of #1    3. Epiretinal membrane left eye, not visually significant and stable.   - Monitor      Return to uveitis clinic in 4 months, V/T/D/mac OCT    Attending Physician Attestation:  Complete documentation of historical and exam elements from today's encounter can be found in the full encounter summary report (not reduplicated in this progress note).  I personally obtained the chief complaint(s) and history of present illness.  I confirmed and edited as necessary the review of systems, past medical/surgical history, family history, social history, and examination findings as documented by others; and I examined the patient myself.  I formulated and edited as necessary the assessment and plan and discussed the findings and management plan with the patient and family.  - Shannon Dominguez M.D.

## 2023-06-03 ENCOUNTER — HEALTH MAINTENANCE LETTER (OUTPATIENT)
Age: 38
End: 2023-06-03

## 2023-07-28 ENCOUNTER — OFFICE VISIT (OUTPATIENT)
Dept: OPHTHALMOLOGY | Facility: CLINIC | Age: 38
End: 2023-07-28
Attending: OPHTHALMOLOGY
Payer: COMMERCIAL

## 2023-07-28 DIAGNOSIS — H35.352 CYSTOID MACULAR DEGENERATION OF RETINA, LEFT: ICD-10-CM

## 2023-07-28 DIAGNOSIS — H30.23 INTERMEDIATE UVEITIS OF BOTH EYES: Primary | ICD-10-CM

## 2023-07-28 DIAGNOSIS — H35.372 EPIRETINAL MEMBRANE (ERM) OF LEFT EYE: ICD-10-CM

## 2023-07-28 PROCEDURE — 99213 OFFICE O/P EST LOW 20 MIN: CPT | Performed by: OPHTHALMOLOGY

## 2023-07-28 PROCEDURE — 92134 CPTRZ OPH DX IMG PST SGM RTA: CPT | Performed by: OPHTHALMOLOGY

## 2023-07-28 PROCEDURE — 92134 CPTRZ OPH DX IMG PST SGM RTA: CPT | Mod: 26 | Performed by: OPHTHALMOLOGY

## 2023-07-28 RX ORDER — MAGNESIUM 64 MG (MAGNESIUM CHLORIDE) TABLET,DELAYED RELEASE
COMMUNITY
Start: 2021-11-30

## 2023-07-28 ASSESSMENT — CONF VISUAL FIELD
OS_SUPERIOR_TEMPORAL_RESTRICTION: 0
OD_NORMAL: 1
OS_SUPERIOR_NASAL_RESTRICTION: 0
OD_INFERIOR_NASAL_RESTRICTION: 0
METHOD: COUNTING FINGERS
OS_INFERIOR_NASAL_RESTRICTION: 0
OS_NORMAL: 1
OD_INFERIOR_TEMPORAL_RESTRICTION: 0
OS_INFERIOR_TEMPORAL_RESTRICTION: 0
OD_SUPERIOR_NASAL_RESTRICTION: 0
OD_SUPERIOR_TEMPORAL_RESTRICTION: 0

## 2023-07-28 ASSESSMENT — REFRACTION_WEARINGRX
OS_SPHERE: -1.50
OD_CYLINDER: +0.75
OS_AXIS: 078
OS_CYLINDER: +2.00
OD_SPHERE: -0.25
SPECS_TYPE: SVL
OD_AXIS: 094

## 2023-07-28 ASSESSMENT — VISUAL ACUITY
OS_CC: 20/20
OS_CC+: -2
CORRECTION_TYPE: GLASSES
OD_CC: 20/20
METHOD: SNELLEN - LINEAR

## 2023-07-28 ASSESSMENT — EXTERNAL EXAM - RIGHT EYE: OD_EXAM: NORMAL

## 2023-07-28 ASSESSMENT — SLIT LAMP EXAM - LIDS
COMMENTS: NORMAL
COMMENTS: NORMAL

## 2023-07-28 ASSESSMENT — CUP TO DISC RATIO
OD_RATIO: 0.1
OS_RATIO: 0.1

## 2023-07-28 ASSESSMENT — TONOMETRY
OD_IOP_MMHG: 12
OS_IOP_MMHG: 11
IOP_METHOD: TONOPEN

## 2023-07-28 ASSESSMENT — EXTERNAL EXAM - LEFT EYE: OS_EXAM: NORMAL

## 2023-07-28 NOTE — NURSING NOTE
Chief Complaints and History of Present Illnesses   Patient presents with    Uveitis Follow-Up     8 month follow up Intermediate uveitis of both eyes      Chief Complaint(s) and History of Present Illness(es)       Uveitis Follow-Up              Laterality: both eyes    Associated symptoms: Negative for eye pain, headache, photophobia, flashes and floaters    Treatments tried: no treatments    Pain scale: 0/10    Comments: 8 month follow up Intermediate uveitis of both eyes               Comments    No vision changes since last visit.   No eye pain or discomfort today.  Denies using any eyedrops.     Manuel Pcek 11:33 AM July 28, 2023
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
## 2024-01-26 ENCOUNTER — OFFICE VISIT (OUTPATIENT)
Dept: OPHTHALMOLOGY | Facility: CLINIC | Age: 39
End: 2024-01-26
Attending: OPHTHALMOLOGY
Payer: COMMERCIAL

## 2024-01-26 DIAGNOSIS — H35.372 EPIRETINAL MEMBRANE (ERM) OF LEFT EYE: ICD-10-CM

## 2024-01-26 DIAGNOSIS — H30.23 INTERMEDIATE UVEITIS OF BOTH EYES: Primary | ICD-10-CM

## 2024-01-26 DIAGNOSIS — H35.352 CYSTOID MACULAR DEGENERATION OF RETINA, LEFT: ICD-10-CM

## 2024-01-26 PROCEDURE — 92235 FLUORESCEIN ANGRPH MLTIFRAME: CPT | Performed by: OPHTHALMOLOGY

## 2024-01-26 PROCEDURE — 99214 OFFICE O/P EST MOD 30 MIN: CPT | Mod: GC | Performed by: OPHTHALMOLOGY

## 2024-01-26 PROCEDURE — 92134 CPTRZ OPH DX IMG PST SGM RTA: CPT | Performed by: OPHTHALMOLOGY

## 2024-01-26 PROCEDURE — 99213 OFFICE O/P EST LOW 20 MIN: CPT | Mod: 25 | Performed by: OPHTHALMOLOGY

## 2024-01-26 PROCEDURE — 92235 FLUORESCEIN ANGRPH MLTIFRAME: CPT | Mod: 26 | Performed by: OPHTHALMOLOGY

## 2024-01-26 PROCEDURE — 92134 CPTRZ OPH DX IMG PST SGM RTA: CPT | Mod: 26 | Performed by: OPHTHALMOLOGY

## 2024-01-26 RX ORDER — PREDNISOLONE ACETATE 10 MG/ML
SUSPENSION/ DROPS OPHTHALMIC
Qty: 10 ML | Refills: 0 | Status: SHIPPED | OUTPATIENT
Start: 2024-01-26 | End: 2024-04-26

## 2024-01-26 ASSESSMENT — CONF VISUAL FIELD
OS_INFERIOR_TEMPORAL_RESTRICTION: 0
OS_SUPERIOR_TEMPORAL_RESTRICTION: 0
METHOD: COUNTING FINGERS
OS_INFERIOR_NASAL_RESTRICTION: 0
OD_INFERIOR_TEMPORAL_RESTRICTION: 0
OS_NORMAL: 1
OD_NORMAL: 1
OS_SUPERIOR_NASAL_RESTRICTION: 0
OD_SUPERIOR_TEMPORAL_RESTRICTION: 0
OD_SUPERIOR_NASAL_RESTRICTION: 0
OD_INFERIOR_NASAL_RESTRICTION: 0

## 2024-01-26 ASSESSMENT — REFRACTION_WEARINGRX
OS_SPHERE: -1.50
SPECS_TYPE: SVL
OD_AXIS: 094
OS_AXIS: 078
OS_CYLINDER: +2.00
OD_SPHERE: -0.25
OD_CYLINDER: +0.75

## 2024-01-26 ASSESSMENT — EXTERNAL EXAM - LEFT EYE: OS_EXAM: NORMAL

## 2024-01-26 ASSESSMENT — VISUAL ACUITY
METHOD: SNELLEN - LINEAR
OD_CC: 20/20
OS_CC: 20/20
CORRECTION_TYPE: GLASSES

## 2024-01-26 ASSESSMENT — CUP TO DISC RATIO
OS_RATIO: 0.1
OD_RATIO: 0.1

## 2024-01-26 ASSESSMENT — TONOMETRY
IOP_METHOD: TONOPEN
OS_IOP_MMHG: 12
OD_IOP_MMHG: 13

## 2024-01-26 ASSESSMENT — EXTERNAL EXAM - RIGHT EYE: OD_EXAM: NORMAL

## 2024-01-26 ASSESSMENT — SLIT LAMP EXAM - LIDS
COMMENTS: NORMAL
COMMENTS: NORMAL

## 2024-01-26 NOTE — PROGRESS NOTES
HPI: Suma Schulz is a 38 year old  female here for 6 month uveitis follow up (extended because of overbooking in December, appt was switched to January). No eye concerns today. Vision is good and stable. No changes in floaters, no new flashes.    Ocular medications: none, although diagnosed with multiple sclerosis in Nov 2021 and started ofatumumab (Kesimpta, anti-CD20 mAb) in March 2022 which may also treat her retinal vasculitis.  Prior ocular medications: oral prednisone (started at 60 mg/day 9.16.17 and tapered, off since 6.15.18), oral methotrexate (started 10.16.17, increased to 25 mg/wk 11.13.17, started tapering June 2020 and stopped Oct 2020), Humira q2wk (4.7.18 - early Nov 2021), IMT previously managed by Dr. Ruchi Martin ([facility]).    Ocular history:   1. 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  2. Posterior synechiae left eye.   3. Retinoschisis cavities left eye.    - 9/2021 noted to have increased size of bullous elevation vs 12/2020, possibly related to inner retinal hole -> S/p additional laser barricade left eye 9.16.21 ([place])    - S/p laser barricade left eye 8.31.17 ([place])  4. IIH diagnosed in 2015 but sympoms started in 2012, treated with 4 months diamox (had side effects: joint swelling and pain). Symptoms resolved near the end of pregnancy.  5. History of red left eye about 10 years prior to uveitis diagnosis, treated with a short course of topical corticosteroids, patient unsure of diagnosis.     Medical history:  1. Multiple sclerosis, diagnosed Nov 2021.   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 2 uncomplicated pregnancies (vaginal delivery), although blood pressure was slightly elevated at the end of 2nd pregnancy.  3. Basal cell carcinoma s/p surgical excision in 2019.    Social history: Teaches special ed .  "Never smoked. Alcohol use: ~one beverage monthly.     Laboratory/Imaging Results:  -MRI brain/orbits 9.14.17: unremarkable.  -MRI brain 11.26.21 Impression: \"Few imaging findings of white matter hyperintense change involves the left mesial frontal lobe, periventricular space, left aiden and medulla. Only the left frontal subcortical space shows enhancement. The collection of above findings is nonspecific and not typical of MS demyelination.\"  -MRI brain 3.10.22 Impression: \"Solitary new subcentimeter signal abnormality in the anterior left frontal lobe along the gray-white matter junction. Pre-existing enhancing signal abnormality in the anterior left frontal subcortical white matter is less conspicuous and no longer enhancing. Pre-existing left ventral medullary signal abnormality is mildly less conspicuous. Otherwise, demyelinating disease is stable without evidence of active demyelination.\"  -MRI thoracic spine 3.10.22 Impression: \"Interval improvement in the multifocal signal abnormalities likely representing demyelination in the thoracic spine with the most conspicuous signal abnormalities at the T3-T4 and T5-T6 levels. No evidence of active demyelination.\"    Labs 8.31.17: Quantiferon, RPR, Treponema pallidum ab, Lyme, HIV, Toxoplasma IgM/IgG, Bartonella henselae IgM/IgG, anti-Anaplsma phagocytophilum (HGA) IgM/IgG, urinary beta-2 microglobulin, anti-MPO, anti-pro3, ACE, ESR, CRP, and HLA-B27 all normal/negative. HSV1 IgG positive, HSV2 IgG negative. Complete blood count (CBC) with diff normal. Complete metabolic panel (CMP) unremarkable except for slightly elevated glucose (116 but non-fasting).    Ocular Imaging:  mac OCT 1.26.24:  Right eye: within normal limits, retinal thickening stable vs 7.28.23  Left eye: mild epiretinal membrane, few small IRF pockets, no SRF, mildly increased retinal thickness vs 7.28.23    Optos fluorescein angiography 1.26.24:  Right eye: essentially within normal limits, no optic " nerve head or large/small vessel leakage  Left eye: no optic nerve head or large/small vessel leakage, mild/moderate macular leakage vs 7.8.22    Impression/Plan:  1. 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 Uveitis remains inactive today on examination. Retinal vasculitis is quiescent right eye and minimally active left eye with slight increase in retinal thickness and rare intraretinal cysts (do not appear to be Epiretinal membrane-associated) on OCT and new mild/moderate macular leakage.   - Continue off systemic IMT for ocular disease (note that Kesimpta prescribed for multiple sclerosis may be helpful for controlling uveitis as well)   - Start Predforte left eye 2 times daily for 1 week, then 1 time daily until next visit    2. History of cystoid macular edema left eye, likely secondary to #1. No recurrence today.   - Few new small pockets of intraretinal fluid in the left eye today vs 7.28.23, not visually significant  - See management of #1    3. Epiretinal membrane left eye, not visually significant and stable.   - Monitor      Return to uveitis clinic in 3 months, V/T/D/mac OCT, sooner if needed    Attending Physician Attestation:  Complete documentation of historical and exam elements from today's encounter can be found in the full encounter summary report (not reduplicated in this progress note).  I personally obtained the chief complaint(s) and history of present illness.  I confirmed and edited as necessary the review of systems, past medical/surgical history, family history, social history, and examination findings as documented by others; and I examined the patient myself.  I formulated and edited as necessary the assessment and plan and discussed the findings and management plan with the patient and family.  - Shannon Dominguez M.D.

## 2024-01-26 NOTE — NURSING NOTE
Chief Complaints and History of Present Illnesses   Patient presents with    Uveitis Follow-Up     Chief Complaint(s) and History of Present Illness(es)       Uveitis Follow-Up              Laterality: both eyes    Onset: gradual    Onset: months ago    Quality: States va is the same since last visit      Severity: moderate    Frequency: intermittently    Associated symptoms: Negative for photophobia, flashes and floaters    Treatments tried: no treatments    Pain scale: 0/10              Comments    Here for Intermediate uveitis of both eyes  Majo Boggs COT 9:48 AM January 26, 2024

## 2024-04-26 ENCOUNTER — OFFICE VISIT (OUTPATIENT)
Dept: OPHTHALMOLOGY | Facility: CLINIC | Age: 39
End: 2024-04-26
Attending: OPHTHALMOLOGY
Payer: COMMERCIAL

## 2024-04-26 DIAGNOSIS — H30.23 INTERMEDIATE UVEITIS OF BOTH EYES: ICD-10-CM

## 2024-04-26 DIAGNOSIS — H35.352 CYSTOID MACULAR DEGENERATION OF RETINA, LEFT: Primary | ICD-10-CM

## 2024-04-26 DIAGNOSIS — H35.372 EPIRETINAL MEMBRANE (ERM) OF LEFT EYE: ICD-10-CM

## 2024-04-26 PROCEDURE — 99213 OFFICE O/P EST LOW 20 MIN: CPT | Performed by: OPHTHALMOLOGY

## 2024-04-26 PROCEDURE — 92134 CPTRZ OPH DX IMG PST SGM RTA: CPT | Performed by: OPHTHALMOLOGY

## 2024-04-26 PROCEDURE — 99214 OFFICE O/P EST MOD 30 MIN: CPT | Performed by: OPHTHALMOLOGY

## 2024-04-26 RX ORDER — PREDNISOLONE ACETATE 10 MG/ML
1 SUSPENSION/ DROPS OPHTHALMIC 3 TIMES DAILY
Qty: 10 ML | Refills: 0 | Status: SHIPPED | OUTPATIENT
Start: 2024-04-26 | End: 2024-06-14

## 2024-04-26 RX ORDER — DIFLUPREDNATE OPHTHALMIC 0.5 MG/ML
1 EMULSION OPHTHALMIC 4 TIMES DAILY
Qty: 5 ML | Refills: 1 | Status: SHIPPED | OUTPATIENT
Start: 2024-04-26

## 2024-04-26 RX ORDER — PREDNISONE 10 MG/1
TABLET ORAL
Qty: 25 TABLET | Refills: 0 | Status: SHIPPED | OUTPATIENT
Start: 2024-04-26 | End: 2024-05-09

## 2024-04-26 ASSESSMENT — VISUAL ACUITY
OD_CC: 20/20
CORRECTION_TYPE: GLASSES
OS_CC+: -2
OS_CC: 20/20
METHOD: SNELLEN - LINEAR

## 2024-04-26 ASSESSMENT — SLIT LAMP EXAM - LIDS
COMMENTS: NORMAL
COMMENTS: NORMAL

## 2024-04-26 ASSESSMENT — REFRACTION_WEARINGRX
OS_AXIS: 078
OS_SPHERE: -1.50
OD_SPHERE: -0.25
OD_CYLINDER: +0.75
OD_AXIS: 094
SPECS_TYPE: SVL
OS_CYLINDER: +2.00

## 2024-04-26 ASSESSMENT — TONOMETRY
OD_IOP_MMHG: 13
IOP_METHOD: TONOPEN
OS_IOP_MMHG: 13

## 2024-04-26 ASSESSMENT — CUP TO DISC RATIO
OD_RATIO: 0.1
OS_RATIO: 0.1

## 2024-04-26 ASSESSMENT — EXTERNAL EXAM - LEFT EYE: OS_EXAM: NORMAL

## 2024-04-26 ASSESSMENT — EXTERNAL EXAM - RIGHT EYE: OD_EXAM: NORMAL

## 2024-04-26 NOTE — PATIENT INSTRUCTIONS
Preferred approach: stop prednisolone drop and use difluprednate left eye four times a day for 1 week then three times a day for 1 week then twice a day. Have intraocular pressure checked 2-3 weeks after starting difluprednate, no change to plan if intraocular pressure is 25 or lower. If higher, stop difluprednate.    Alternative approach: increase prednisolone drop to three times a day right eye. Start oral prednisone 40 mg/day for 3 days then 20 mg/day for 3 days then 10 mg/day for 7 days. No need for local intraocular pressure check with this approach.

## 2024-04-26 NOTE — NURSING NOTE
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   No eye pain, redness or irritation.   No new concerns.    Ocular meds:  Prednisolone once daily left eye     Manuel Ho 10:23 AM April 26, 2024

## 2024-04-26 NOTE — PROGRESS NOTES
HPI: Suma Schulz is a 39 year old  female here for 3 month uveitis follow up. Again, no eye concerns today. Vision is good and stable. Only rarely has floaters (mostly left eye) which have not increased in frequency, no flashing lights.    Ocular medications: prednisolone acetate once daily left eye, diagnosed with multiple sclerosis in Nov 2021 and started ofatumumab (Kesimpta, anti-CD20 mAb) in March 2022 which may also treat her retinal vasculitis.  Prior ocular medications: oral prednisone (started at 60 mg/day 9.16.17 and tapered, off since 6.15.18), oral methotrexate (started 10.16.17, increased to 25 mg/wk 11.13.17, started tapering June 2020 and stopped Oct 2020, s/e: nausea, fatigue), Humira q2wk (4.7.18 - early Nov 2021), IMT previously managed by Dr. Ruchi Martin ([facility] [place]).    Ocular history:   1. 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  2. Posterior synechiae left eye.   3. Retinoschisis cavities left eye.    - 9/2021 noted to have increased size of bullous elevation vs 12/2020, possibly related to inner retinal hole -> S/p additional laser barricade left eye 9.16.21 ([place])    - S/p laser barricade left eye 8.31.17 ([place])  4. IIH diagnosed in 2015 but sympoms started in 2012, treated with 4 months diamox (had side effects: joint swelling and pain). Symptoms resolved near the end of pregnancy.  5. History of red left eye about 10 years prior to uveitis diagnosis, treated with a short course of topical corticosteroids, patient unsure of diagnosis.     Medical history:  1. Multiple sclerosis, diagnosed Nov 2021.   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 2 uncomplicated pregnancies (vaginal delivery), although blood pressure was slightly elevated at the end of 2nd pregnancy.  3. Basal cell carcinoma s/p surgical excision in 2019.    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 Alcohol use: ~one beverage monthly.     Laboratory/Imaging Results:  -MRI brain/orbits 9.14.17: unremarkable.  -MRI brain 11.26.21 Impression: \"Few imaging findings of white matter hyperintense change involves the left mesial frontal lobe, periventricular space, left aiden and medulla. Only the left frontal subcortical space shows enhancement. The collection of above findings is nonspecific and not typical of MS demyelination.\"  -MRI brain 3.10.22 Impression: \"Solitary new subcentimeter signal abnormality in the anterior left frontal lobe along the gray-white matter junction. Pre-existing enhancing signal abnormality in the anterior left frontal subcortical white matter is less conspicuous and no longer enhancing. Pre-existing left ventral medullary signal abnormality is mildly less conspicuous. Otherwise, demyelinating disease is stable without evidence of active demyelination.\"  -MRI thoracic spine 3.10.22 Impression: \"Interval improvement in the multifocal signal abnormalities likely representing demyelination in the thoracic spine with the most conspicuous signal abnormalities at the T3-T4 and T5-T6 levels. No evidence of active demyelination.\"    Labs 8.31.17: Quantiferon, RPR, Treponema pallidum ab, Lyme, HIV, Toxoplasma IgM/IgG, Bartonella henselae IgM/IgG, anti-Anaplsma phagocytophilum (HGA) IgM/IgG, urinary beta-2 microglobulin, anti-MPO, anti-pro3, ACE, ESR, CRP, and HLA-B27 all normal/negative. HSV1 IgG positive, HSV2 IgG negative. Complete blood count (CBC) with diff normal. Complete metabolic panel (CMP) unremarkable except for slightly elevated glucose (116 but non-fasting).    Ocular Imaging:  mac OCT 4.26.24:  Right eye: within normal limits, retinal thickening stable vs 1.26.24  Left eye: mild epiretinal membrane, maintains foveal contour but there is increased intraretinal fluid and moderately increased retinal thickness vs 1.26.24, no " SRF    Optos fluorescein angiography 1.26.24:  Right eye: essentially within normal limits, no optic nerve head or large/small vessel leakage  Left eye: no optic nerve head or large/small vessel leakage, mild/moderate macular leakage vs 7.8.22    Impression/Plan:  1. 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 Uveitis remains inactive today on examination. Retinal vasculitis is quiescent right eye and but mildly active left eye with slight increase in retinal thickness and mild uveitic cystoid macular edema on OCT and new mild/moderate macular leakage on fluorescein angiography 3 months ago.   - Continue off systemic IMT for ocular disease (note that Kesimpta prescribed for multiple sclerosis may be helpful for controlling uveitis as well) for now. However, restarting IMT for ocular inflammation may be indicated in the near future; would consider mycophenolate versus azathioprine given patient's aversion to methotrexate; sending note to neurology to see if either medication would be contraindicated or if there is a preference for one over the other    2. Cystoid macular edema left eye, likely secondary to #1. Mild, asymptomatic recurrence today.   - Preferred approach (but difluprednate may be too expensive): stop prednisolone drop and use difluprednate left eye four times a day for 1 week then three times a day for 1 week then twice a day. Have intraocular pressure checked 2-3 weeks after starting difluprednate, no change to plan if intraocular pressure is 25 or lower. If higher, stop difluprednate.  - Alternative approach: increase prednisolone drop to three times a day right eye. Start oral prednisone 40 mg/day for 3 days then 20 mg/day for 3 days then 10 mg/day for 7 days.    3. Epiretinal membrane left eye, not visually significant and stable.   - Monitor      Return to uveitis clinic in 6 weeks, V/T/mac OCT, sooner if needed    Attending Physician  Attestation:  Complete documentation of historical and exam elements from today's encounter can be found in the full encounter summary report (not reduplicated in this progress note).  I personally obtained the chief complaint(s) and history of present illness.  I confirmed and edited as necessary the review of systems, past medical/surgical history, family history, social history, and examination findings as documented by others; and I examined the patient myself.  I formulated and edited as necessary the assessment and plan and discussed the findings and management plan with the patient and family.  - Shannon Dominguez M.D.

## 2024-04-26 NOTE — LETTER
4/26/2024       RE: Suma Schulz  [address]  [facility] [address]     Dear Dr. Edwards,    I had the pleasure of seeing our mutual patient, Smua Schulz, at the [facility] EYE [facility]. She has a mild recurrence of uveitic cystoid macular edema left eye, which I am treating with corticosteroids. If the recurrence persists, restarting systemic immunomodulatory medication may be indicated. In this case, I would consider starting either mycophenolate or azathioprine; please let me know if there is a contraindication to either of these medications from your standpoint. Please see a copy of my visit note below.    HPI: Suma Schulz is a 39 year old  female here for 3 month uveitis follow up. Again, no eye concerns today. Vision is good and stable. Only rarely has floaters (mostly left eye) which have not increased in frequency, no flashing lights.    Ocular medications: prednisolone acetate once daily left eye, diagnosed with multiple sclerosis in Nov 2021 and started ofatumumab (Kesimpta, anti-CD20 mAb) in March 2022 which may also treat her retinal vasculitis.  Prior ocular medications: oral prednisone (started at 60 mg/day 9.16.17 and tapered, off since 6.15.18), oral methotrexate (started 10.16.17, increased to 25 mg/wk 11.13.17, started tapering June 2020 and stopped Oct 2020, s/e: nausea, fatigue), Humira q2wk (4.7.18 - early Nov 2021), IMT previously managed by Dr. Ruchi Martin ([facility]).    Ocular history:   1. 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  2. Posterior synechiae left eye.   3. Retinoschisis cavities left eye.    - 9/2021 noted to have increased size of bullous elevation vs 12/2020, possibly related to inner retinal hole -> S/p additional laser barricade left eye 9.16.21 ([place])    - S/p laser  "barricade left eye 8.31.17 ([place])  4. IIH diagnosed in 2015 but sympoms started in 2012, treated with 4 months diamox (had side effects: joint swelling and pain). Symptoms resolved near the end of pregnancy.  5. History of red left eye about 10 years prior to uveitis diagnosis, treated with a short course of topical corticosteroids, patient unsure of diagnosis.     Medical history:  1. Multiple sclerosis, diagnosed Nov 2021.   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 2 uncomplicated pregnancies (vaginal delivery), although blood pressure was slightly elevated at the end of 2nd pregnancy.  3. Basal cell carcinoma s/p surgical excision in 2019.    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     Laboratory/Imaging Results:  -MRI brain/orbits 9.14.17: unremarkable.  -MRI brain 11.26.21 Impression: \"Few imaging findings of white matter hyperintense change involves the left mesial frontal lobe, periventricular space, left aiden and medulla. Only the left frontal subcortical space shows enhancement. The collection of above findings is nonspecific and not typical of MS demyelination.\"  -MRI brain 3.10.22 Impression: \"Solitary new subcentimeter signal abnormality in the anterior left frontal lobe along the gray-white matter junction. Pre-existing enhancing signal abnormality in the anterior left frontal subcortical white matter is less conspicuous and no longer enhancing. Pre-existing left ventral medullary signal abnormality is mildly less conspicuous. Otherwise, demyelinating disease is stable without evidence of active demyelination.\"  -MRI thoracic spine 3.10.22 Impression: \"Interval improvement in the multifocal signal abnormalities likely representing demyelination in the thoracic spine with the most conspicuous signal abnormalities at the T3-T4 and T5-T6 levels. No evidence of active demyelination.\"    Labs 8.31.17: " Quantiferon, RPR, Treponema pallidum ab, Lyme, HIV, Toxoplasma IgM/IgG, Bartonella henselae IgM/IgG, anti-Anaplsma phagocytophilum (HGA) IgM/IgG, urinary beta-2 microglobulin, anti-MPO, anti-pro3, ACE, ESR, CRP, and HLA-B27 all normal/negative. HSV1 IgG positive, HSV2 IgG negative. Complete blood count (CBC) with diff normal. Complete metabolic panel (CMP) unremarkable except for slightly elevated glucose (116 but non-fasting).    Ocular Imaging:  mac OCT 4.26.24:  Right eye: within normal limits, retinal thickening stable vs 1.26.24  Left eye: mild epiretinal membrane, maintains foveal contour but there is increased intraretinal fluid and moderately increased retinal thickness vs 1.26.24, no SRF    Optos fluorescein angiography 1.26.24:  Right eye: essentially within normal limits, no optic nerve head or large/small vessel leakage  Left eye: no optic nerve head or large/small vessel leakage, mild/moderate macular leakage vs 7.8.22    Impression/Plan:  1. 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 Uveitis remains inactive today on examination. Retinal vasculitis is quiescent right eye and but mildly active left eye with slight increase in retinal thickness and mild uveitic cystoid macular edema on OCT and new mild/moderate macular leakage on fluorescein angiography 3 months ago.   - Continue off systemic IMT for ocular disease (note that Kesimpta prescribed for multiple sclerosis may be helpful for controlling uveitis as well) for now. However, restarting IMT for ocular inflammation may be indicated in the near future; would consider mycophenolate versus azathioprine given patient's aversion to methotrexate; sending note to neurology to see if either medication would be contraindicated or if there is a preference for one over the other    2. Cystoid macular edema left eye, likely secondary to #1. Mild, asymptomatic recurrence today.   - Preferred  approach (but difluprednate may be too expensive): stop prednisolone drop and use difluprednate left eye four times a day for 1 week then three times a day for 1 week then twice a day. Have intraocular pressure checked 2-3 weeks after starting difluprednate, no change to plan if intraocular pressure is 25 or lower. If higher, stop difluprednate.  - Alternative approach: increase prednisolone drop to three times a day right eye. Start oral prednisone 40 mg/day for 3 days then 20 mg/day for 3 days then 10 mg/day for 7 days.    3. Epiretinal membrane left eye, not visually significant and stable.   - Monitor      Return to uveitis clinic in 6 weeks, V/T/mac OCT, sooner if needed    Attending Physician Attestation:  Complete documentation of historical and exam elements from today's encounter can be found in the full encounter summary report (not reduplicated in this progress note).  I personally obtained the chief complaint(s) and history of present illness.  I confirmed and edited as necessary the review of systems, past medical/surgical history, family history, social history, and examination findings as documented by others; and I examined the patient myself.  I formulated and edited as necessary the assessment and plan and discussed the findings and management plan with the patient and family.  - Shannon Dominguez M.D.      Again, thank you for allowing me to participate in the care of your patient.      Sincerely,    Shannon Dominguez MD

## 2024-06-14 ENCOUNTER — OFFICE VISIT (OUTPATIENT)
Dept: OPHTHALMOLOGY | Facility: CLINIC | Age: 39
End: 2024-06-14
Attending: OPHTHALMOLOGY
Payer: COMMERCIAL

## 2024-06-14 DIAGNOSIS — H35.372 EPIRETINAL MEMBRANE (ERM) OF LEFT EYE: ICD-10-CM

## 2024-06-14 DIAGNOSIS — H35.352 CYSTOID MACULAR DEGENERATION OF RETINA, LEFT: ICD-10-CM

## 2024-06-14 DIAGNOSIS — H30.23 INTERMEDIATE UVEITIS OF BOTH EYES: Primary | ICD-10-CM

## 2024-06-14 PROCEDURE — 92134 CPTRZ OPH DX IMG PST SGM RTA: CPT | Mod: 26 | Performed by: OPHTHALMOLOGY

## 2024-06-14 PROCEDURE — 99214 OFFICE O/P EST MOD 30 MIN: CPT | Mod: GC | Performed by: OPHTHALMOLOGY

## 2024-06-14 PROCEDURE — 92134 CPTRZ OPH DX IMG PST SGM RTA: CPT | Performed by: OPHTHALMOLOGY

## 2024-06-14 PROCEDURE — 99214 OFFICE O/P EST MOD 30 MIN: CPT | Performed by: OPHTHALMOLOGY

## 2024-06-14 ASSESSMENT — TONOMETRY
OS_IOP_MMHG: 16
OD_IOP_MMHG: 16
IOP_METHOD: TONOPEN

## 2024-06-14 ASSESSMENT — REFRACTION_WEARINGRX
OS_AXIS: 078
OD_CYLINDER: +0.75
OD_SPHERE: -0.25
OS_CYLINDER: +2.00
OS_SPHERE: -1.50
OD_AXIS: 094
SPECS_TYPE: SVL

## 2024-06-14 ASSESSMENT — SLIT LAMP EXAM - LIDS
COMMENTS: NORMAL
COMMENTS: NORMAL

## 2024-06-14 ASSESSMENT — VISUAL ACUITY
METHOD: SNELLEN - LINEAR
OD_CC: 20/20
CORRECTION_TYPE: GLASSES
OS_CC: 20/20

## 2024-06-14 ASSESSMENT — EXTERNAL EXAM - LEFT EYE: OS_EXAM: NORMAL

## 2024-06-14 ASSESSMENT — CUP TO DISC RATIO
OS_RATIO: 0.1
OD_RATIO: 0.1

## 2024-06-14 ASSESSMENT — EXTERNAL EXAM - RIGHT EYE: OD_EXAM: NORMAL

## 2024-06-14 NOTE — PROGRESS NOTES
HPI: Suma Schulz is a 39 year old  female here for 2 month uveitis follow up. No eye concerns today; no changes in vision since last visit. Vision is good and stable. Only rarely has floaters (mostly left eye) which have not increased in frequency, no flashing lights.    Ocular medications: Difluprednate left eye twice a day.   Diagnosed with multiple sclerosis in Nov 2021 and started ofatumumab (Kesimpta, anti-CD20 mAb) in March 2022 which may also treat her retinal vasculitis.    Prior ocular medications: oral prednisone (started at 60 mg/day 9.16.17 and tapered, off since 6.15.18), oral methotrexate (started 10.16.17, increased to 25 mg/wk 11.13.17, started tapering June 2020 and stopped Oct 2020, s/e: nausea, fatigue), Humira q2wk (4.7.18 - early Nov 2021), IMT previously managed by Dr. Ruchi Martin ([facility]).    Ocular history:   1. 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   Mild cystoid macular edema left eye in April 2024, treated with 8 week tapering course of Durezol  2. Posterior synechiae left eye.   3. Retinoschisis cavities left eye.    - 9/2021 noted to have increased size of bullous elevation vs 12/2020, possibly related to inner retinal hole -> S/p additional laser barricade left eye 9.16.21 ([place])    - S/p laser barricade left eye 8.31.17 ([place])  4. IIH diagnosed in 2015 but sympoms started in 2012, treated with 4 months diamox (had side effects: joint swelling and pain). Symptoms resolved near the end of pregnancy.  5. History of red left eye about 10 years prior to uveitis diagnosis, treated with a short course of topical corticosteroids, patient unsure of diagnosis.     Medical history:  1. Multiple sclerosis, diagnosed Nov 2021.   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 Blood pressure was slightly elevated at the end of 2nd  "pregnancy.  3. Basal cell carcinoma s/p surgical excision in 2019.    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 Alcohol use: ~one beverage monthly.     Laboratory/Imaging Results:  -MRI brain/orbits 9.14.17: unremarkable.  -MRI brain 11.26.21 Impression: \"Few imaging findings of white matter hyperintense change involves the left mesial frontal lobe, periventricular space, left aiden and medulla. Only the left frontal subcortical space shows enhancement. The collection of above findings is nonspecific and not typical of MS demyelination.\"  -MRI brain 3.10.22 Impression: \"Solitary new subcentimeter signal abnormality in the anterior left frontal lobe along the gray-white matter junction. Pre-existing enhancing signal abnormality in the anterior left frontal subcortical white matter is less conspicuous and no longer enhancing. Pre-existing left ventral medullary signal abnormality is mildly less conspicuous. Otherwise, demyelinating disease is stable without evidence of active demyelination.\"  -MRI thoracic spine 3.10.22 Impression: \"Interval improvement in the multifocal signal abnormalities likely representing demyelination in the thoracic spine with the most conspicuous signal abnormalities at the T3-T4 and T5-T6 levels. No evidence of active demyelination.\"    Labs 8.31.17: Quantiferon, RPR, Treponema pallidum ab, Lyme, HIV, Toxoplasma IgM/IgG, Bartonella henselae IgM/IgG, anti-Anaplsma phagocytophilum (HGA) IgM/IgG, urinary beta-2 microglobulin, anti-MPO, anti-pro3, ACE, ESR, CRP, and HLA-B27 all normal/negative. HSV1 IgG positive, HSV2 IgG negative. Complete blood count (CBC) with diff normal. Complete metabolic panel (CMP) unremarkable except for slightly elevated glucose (116 but non-fasting).    Ocular Imaging:  mac OCT 6.14.24:  Right eye: within normal limits, retinal thickening stable vs 4.26.24  Left eye: mild epiretinal membrane, maintains foveal contour improved " cytoid macular edema vs 4.26.24, no SRF    Optos fluorescein angiography 1.26.24:  Right eye: essentially within normal limits, no optic nerve head or large/small vessel leakage  Left eye: no optic nerve head or large/small vessel leakage, mild/moderate macular leakage vs 7.8.22    Impression/Plan:  1. 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 Uveitis remains inactive today on examination. Retinal vasculitis is quiescent right eye and but mildly active left eye with slight increase in retinal thickness and mild uveitic cystoid macular edema on OCT and new mild/moderate macular leakage on fluorescein angiography 3 months ago.   - Continue off systemic IMT for ocular disease (note that Kesimpta prescribed for multiple sclerosis may be helpful for controlling uveitis as well) for now. However, restarting IMT for ocular inflammation may be indicated in the near future; would consider mycophenolate versus azathioprine given patient's aversion to methotrexate.    2. Cystoid macular edema left eye, likely secondary to #1. Macular edema resolved today.   - Decrease difluprednate to once daily for 2 weeks then stop. If cystoid macular edema recurs, will plan for longer course of topical corticosteroids (and will see if prednisolone would be therapeutic)     3. Epiretinal membrane left eye, not visually significant and stable.   - Monitor    Return to uveitis clinic in 6-8 weeks, V/T/mac OCT, sooner if needed.    Michel Black MD  PGY-5 Vitreo-retina surgery Fellow  Department of Ophthalmology   [facility]    Attending Physician Attestation:  Complete documentation of historical and exam elements from today's encounter can be found in the full encounter summary report (not reduplicated in this progress note).  I personally obtained the chief complaint(s) and history of present illness.  I confirmed and edited as necessary the review of systems,  past medical/surgical history, family history, social history, and examination findings as documented by others; and I examined the patient myself.  I formulated and edited as necessary the assessment and plan and discussed the findings and management plan with the patient and family.  - Shannon Dominguez M.D.

## 2024-06-14 NOTE — NURSING NOTE
Chief Complaints and History of Present Illnesses   Patient presents with    Uveitis Follow-Up     6 week follow up.   1. 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  2. Cystoid macular edema left eye  3. Epiretinal membrane left eye       Chief Complaint(s) and History of Present Illness(es)       Uveitis Follow-Up              Laterality: both eyes    Onset: weeks ago    Course: stable    Associated symptoms: floaters (occasional floaters, not new).  Negative for eye pain, redness, tearing, photophobia and flashes    Treatments tried: eye drops and glasses    Pain scale: 0/10    Comments: 6 week follow up.   1. 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  2. Cystoid macular edema left eye  3. Epiretinal membrane left eye                Comments    Patient notes vision is stable each eye in the last 6 weeks. Denies eye pain.     Ocular meds:   - Difluprednate BID left eye     MIHAELA Pitts 9:31 AM 06/14/2024

## 2024-07-06 ENCOUNTER — HEALTH MAINTENANCE LETTER (OUTPATIENT)
Age: 39
End: 2024-07-06

## 2024-08-09 ENCOUNTER — OFFICE VISIT (OUTPATIENT)
Dept: OPHTHALMOLOGY | Facility: CLINIC | Age: 39
End: 2024-08-09
Attending: OPHTHALMOLOGY
Payer: COMMERCIAL

## 2024-08-09 DIAGNOSIS — H30.23 INTERMEDIATE UVEITIS OF BOTH EYES: Primary | ICD-10-CM

## 2024-08-09 DIAGNOSIS — H35.372 EPIRETINAL MEMBRANE (ERM) OF LEFT EYE: ICD-10-CM

## 2024-08-09 DIAGNOSIS — H35.352 CYSTOID MACULAR DEGENERATION OF RETINA, LEFT: ICD-10-CM

## 2024-08-09 PROCEDURE — 99213 OFFICE O/P EST LOW 20 MIN: CPT | Performed by: OPHTHALMOLOGY

## 2024-08-09 PROCEDURE — 92134 CPTRZ OPH DX IMG PST SGM RTA: CPT | Mod: 26 | Performed by: OPHTHALMOLOGY

## 2024-08-09 PROCEDURE — 99212 OFFICE O/P EST SF 10 MIN: CPT | Performed by: OPHTHALMOLOGY

## 2024-08-09 PROCEDURE — 92134 CPTRZ OPH DX IMG PST SGM RTA: CPT | Performed by: OPHTHALMOLOGY

## 2024-08-09 ASSESSMENT — VISUAL ACUITY
OS_CC+: -1
OD_CC: 20/20
OD_CC+: -1
METHOD: SNELLEN - LINEAR
OS_CC: 20/20
CORRECTION_TYPE: GLASSES

## 2024-08-09 ASSESSMENT — TONOMETRY
OD_IOP_MMHG: 17
IOP_METHOD: TONOPEN
OS_IOP_MMHG: 16

## 2024-08-09 ASSESSMENT — EXTERNAL EXAM - RIGHT EYE: OD_EXAM: NORMAL

## 2024-08-09 ASSESSMENT — CONF VISUAL FIELD
OD_SUPERIOR_TEMPORAL_RESTRICTION: 0
OD_SUPERIOR_NASAL_RESTRICTION: 0
OD_NORMAL: 1
OD_INFERIOR_TEMPORAL_RESTRICTION: 0
OS_SUPERIOR_NASAL_RESTRICTION: 0
OS_NORMAL: 1
OD_INFERIOR_NASAL_RESTRICTION: 0
OS_INFERIOR_TEMPORAL_RESTRICTION: 0
OS_SUPERIOR_TEMPORAL_RESTRICTION: 0
OS_INFERIOR_NASAL_RESTRICTION: 0

## 2024-08-09 ASSESSMENT — REFRACTION_WEARINGRX
OD_SPHERE: -0.25
OD_CYLINDER: +0.75
OS_CYLINDER: +2.00
OD_AXIS: 094
SPECS_TYPE: SVL
OS_SPHERE: -1.50
OS_AXIS: 078

## 2024-08-09 ASSESSMENT — EXTERNAL EXAM - LEFT EYE: OS_EXAM: NORMAL

## 2024-08-09 ASSESSMENT — CUP TO DISC RATIO
OD_RATIO: 0.1
OS_RATIO: 0.1

## 2024-08-09 ASSESSMENT — SLIT LAMP EXAM - LIDS
COMMENTS: NORMAL
COMMENTS: NORMAL

## 2024-08-09 NOTE — NURSING NOTE
Chief Complaints and History of Present Illnesses   Patient presents with    Uveitis Follow-Up     7 week Uveitis follow up   Pt reports no vision change since last visit  Viridiana CHIRINOS 12:09 PM August 9, 2024        Chief Complaint(s) and History of Present Illness(es)       Uveitis Follow-Up              Laterality: both eyes    Associated symptoms: Negative for eye pain, pain with eye movement, flashes and floaters    Treatments tried: no treatments    Comments: 7 week Uveitis follow up   Pt reports no vision change since last visit  Viridiana CHIRINOS 12:09 PM August 9, 2024

## 2024-08-09 NOTE — PROGRESS NOTES
HPI: Suma Schulz is a 39 year old female here for 2 month uveitis follow up. No eye concerns today; no changes in vision since last visit. Vision is good and stable. Stopped Durezol about 1.5 months ago. Doesn't see floaters left eye anymore, now no floaters or flashing lights in either eye.    Ocular medications: None.  Diagnosed with multiple sclerosis in Nov 2021 and started ofatumumab (Kesimpta, anti-CD20 mAb) in March 2022 which may also treat her retinal vasculitis.    Prior ocular medications: oral prednisone (started at 60 mg/day 9.16.17 and tapered, off since 6.15.18), oral methotrexate (started 10.16.17, increased to 25 mg/wk 11.13.17, started tapering June 2020 and stopped Oct 2020, s/e: nausea, fatigue), Humira q2wk (4.7.18 - early Nov 2021), IMT previously managed by Dr. Ruchi Martin ([facility]). Difluprednate left eye in April-June 2024.    Ocular history:   1. 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   Mild cystoid macular edema left eye in April 2024, treated with 8 week tapering course of Durezol  2. Posterior synechiae left eye.   3. Retinoschisis cavities left eye.    - 9/2021 noted to have increased size of bullous elevation vs 12/2020, possibly related to inner retinal hole -> S/p additional laser barricade left eye 9.16.21 ([place])    - S/p laser barricade left eye 8.31.17 ([place])  4. IIH diagnosed in 2015 but sympoms started in 2012, treated with 4 months diamox (had side effects: joint swelling and pain). Symptoms resolved near the end of pregnancy.  5. History of red left eye about 10 years prior to uveitis diagnosis, treated with a short course of topical corticosteroids, patient unsure of diagnosis.     Medical history:  1. Multiple sclerosis, diagnosed Nov 2021.   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 Blood pressure was slightly  "elevated at the end of 2nd pregnancy.  3. Basal cell carcinoma s/p surgical excision in 2019.    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 Alcohol use: ~one beverage monthly.     Laboratory/Imaging Results:  -MRI brain/orbits 9.14.17: unremarkable.  -MRI brain 11.26.21 Impression: \"Few imaging findings of white matter hyperintense change involves the left mesial frontal lobe, periventricular space, left aiden and medulla. Only the left frontal subcortical space shows enhancement. The collection of above findings is nonspecific and not typical of MS demyelination.\"  -MRI brain 3.10.22 Impression: \"Solitary new subcentimeter signal abnormality in the anterior left frontal lobe along the gray-white matter junction. Pre-existing enhancing signal abnormality in the anterior left frontal subcortical white matter is less conspicuous and no longer enhancing. Pre-existing left ventral medullary signal abnormality is mildly less conspicuous. Otherwise, demyelinating disease is stable without evidence of active demyelination.\"  -MRI thoracic spine 3.10.22 Impression: \"Interval improvement in the multifocal signal abnormalities likely representing demyelination in the thoracic spine with the most conspicuous signal abnormalities at the T3-T4 and T5-T6 levels. No evidence of active demyelination.\"    Labs 8.31.17: Quantiferon, RPR, Treponema pallidum ab, Lyme, HIV, Toxoplasma IgM/IgG, Bartonella henselae IgM/IgG, anti-Anaplsma phagocytophilum (HGA) IgM/IgG, urinary beta-2 microglobulin, anti-MPO, anti-pro3, ACE, ESR, CRP, and HLA-B27 all normal/negative. HSV1 IgG positive, HSV2 IgG negative. Complete blood count (CBC) with diff normal. Complete metabolic panel (CMP) unremarkable except for slightly elevated glucose (116 but non-fasting).    Ocular Imaging:  mac OCT 8.9.24:  Right eye: within normal limits, retinal thickening stable vs 6.14.24  Left eye: mild epiretinal membrane, good foveal " contour, resolution of cystoid macular edema, no SRF    Optos fluorescein angiography 1.26.24:  Right eye: essentially within normal limits, no optic nerve head or large/small vessel leakage  Left eye: no optic nerve head or large/small vessel leakage, mild/moderate macular leakage vs 7.8.22    Impression/Plan:  1. 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 Uveitis remains inactive today on examination and no cystoid macular edema on exam/OCT today.   - Continue off systemic IMT for ocular disease (note that Kesimpta prescribed for multiple sclerosis may be helpful for controlling uveitis as well) for now. However, restarting IMT for ocular inflammation may be indicated in the future; would consider mycophenolate versus azathioprine given patient's aversion to methotrexate.    2. Cystoid macular edema left eye, likely secondary to #1. Macular edema resolved today.   - Monitor    3. Epiretinal membrane left eye, not visually significant and stable.   - Monitor    Return to uveitis clinic in 3 month, V/T/D/mac OCT, sooner if needed.      Attending Physician Attestation:  Complete documentation of historical and exam elements from today's encounter can be found in the full encounter summary report (not reduplicated in this progress note).  I personally obtained the chief complaint(s) and history of present illness.  I confirmed and edited as necessary the review of systems, past medical/surgical history, family history, social history, and examination findings as documented by others; and I examined the patient myself.  I formulated and edited as necessary the assessment and plan and discussed the findings and management plan with the patient and family.  - Shannon Dominguez M.D.

## 2024-08-12 ENCOUNTER — MYC MEDICAL ADVICE (OUTPATIENT)
Dept: OPHTHALMOLOGY | Facility: CLINIC | Age: 39
End: 2024-08-12
Payer: COMMERCIAL

## 2024-08-12 DIAGNOSIS — H35.352 CYSTOID MACULAR DEGENERATION OF RETINA, LEFT: ICD-10-CM

## 2024-08-12 DIAGNOSIS — H30.23 INTERMEDIATE UVEITIS OF BOTH EYES: Primary | ICD-10-CM

## 2024-08-12 DIAGNOSIS — H35.372 EPIRETINAL MEMBRANE (ERM) OF LEFT EYE: ICD-10-CM

## 2024-10-07 ENCOUNTER — TRANSFERRED RECORDS (OUTPATIENT)
Dept: HEALTH INFORMATION MANAGEMENT | Facility: CLINIC | Age: 39
End: 2024-10-07
Payer: COMMERCIAL

## 2024-11-05 NOTE — NURSING NOTE
Chief Complaints and History of Present Illnesses   Patient presents with     Follow Up     Chief Complaint(s) and History of Present Illness(es)     Follow Up     Laterality: both eyes    Associated symptoms: Negative for eye pain, photophobia, headache and flashes              Comments     Pt here for 4 month follow up on Bullous Retinoschisis, left eye status post laser retinopexy left eye and Intermediate uveitis, Idiopathic each eye. Vision is stable since last exam. No new concerns.   Pt using:  Humira q2 weeks- stopped due to infection in November  TARAN SANTIAGO 10:02 AM March 18, 2022                    Patient will be giving the office a call back to reschedule her appt with Dr. Clement on 12/16. It was for a 6 month follow up

## 2025-02-09 ENCOUNTER — HEALTH MAINTENANCE LETTER (OUTPATIENT)
Age: 40
End: 2025-02-09

## 2025-07-13 ENCOUNTER — HEALTH MAINTENANCE LETTER (OUTPATIENT)
Age: 40
End: 2025-07-13

## 2025-07-29 ENCOUNTER — TRANSFERRED RECORDS (OUTPATIENT)
Dept: HEALTH INFORMATION MANAGEMENT | Facility: CLINIC | Age: 40
End: 2025-07-29
Payer: COMMERCIAL